# Patient Record
Sex: FEMALE | Race: WHITE | NOT HISPANIC OR LATINO
[De-identification: names, ages, dates, MRNs, and addresses within clinical notes are randomized per-mention and may not be internally consistent; named-entity substitution may affect disease eponyms.]

---

## 2017-01-18 ENCOUNTER — APPOINTMENT (OUTPATIENT)
Dept: NEUROLOGY | Facility: CLINIC | Age: 42
End: 2017-01-18

## 2017-01-18 VITALS
HEART RATE: 75 BPM | SYSTOLIC BLOOD PRESSURE: 97 MMHG | HEIGHT: 65 IN | WEIGHT: 187 LBS | OXYGEN SATURATION: 95 % | TEMPERATURE: 98 F | DIASTOLIC BLOOD PRESSURE: 62 MMHG | BODY MASS INDEX: 31.16 KG/M2

## 2017-01-18 DIAGNOSIS — Z83.49 FAMILY HISTORY OF OTHER ENDOCRINE, NUTRITIONAL AND METABOLIC DISEASES: ICD-10-CM

## 2017-01-18 DIAGNOSIS — Z82.0 FAMILY HISTORY OF EPILEPSY AND OTHER DISEASES OF THE NERVOUS SYSTEM: ICD-10-CM

## 2017-01-18 DIAGNOSIS — Z83.3 FAMILY HISTORY OF DIABETES MELLITUS: ICD-10-CM

## 2017-03-01 ENCOUNTER — OUTPATIENT (OUTPATIENT)
Dept: OUTPATIENT SERVICES | Facility: HOSPITAL | Age: 42
LOS: 1 days | End: 2017-03-01
Payer: COMMERCIAL

## 2017-03-01 PROCEDURE — 70551 MRI BRAIN STEM W/O DYE: CPT

## 2017-03-01 PROCEDURE — 70551 MRI BRAIN STEM W/O DYE: CPT | Mod: 26

## 2017-03-15 ENCOUNTER — APPOINTMENT (OUTPATIENT)
Dept: NEUROLOGY | Facility: CLINIC | Age: 42
End: 2017-03-15

## 2017-03-15 VITALS
DIASTOLIC BLOOD PRESSURE: 75 MMHG | HEART RATE: 78 BPM | SYSTOLIC BLOOD PRESSURE: 114 MMHG | WEIGHT: 187 LBS | HEIGHT: 65 IN | BODY MASS INDEX: 31.16 KG/M2 | TEMPERATURE: 97.5 F | OXYGEN SATURATION: 88 %

## 2017-05-16 ENCOUNTER — FORM ENCOUNTER (OUTPATIENT)
Age: 42
End: 2017-05-16

## 2017-05-17 ENCOUNTER — APPOINTMENT (OUTPATIENT)
Dept: NEUROLOGY | Facility: CLINIC | Age: 42
End: 2017-05-17

## 2017-05-17 ENCOUNTER — APPOINTMENT (OUTPATIENT)
Dept: ORTHOPEDIC SURGERY | Facility: CLINIC | Age: 42
End: 2017-05-17

## 2017-05-17 ENCOUNTER — OUTPATIENT (OUTPATIENT)
Dept: OUTPATIENT SERVICES | Facility: HOSPITAL | Age: 42
LOS: 1 days | End: 2017-05-17
Payer: COMMERCIAL

## 2017-05-17 VITALS
SYSTOLIC BLOOD PRESSURE: 119 MMHG | HEIGHT: 65 IN | WEIGHT: 199 LBS | TEMPERATURE: 98.5 F | BODY MASS INDEX: 33.15 KG/M2 | DIASTOLIC BLOOD PRESSURE: 73 MMHG | OXYGEN SATURATION: 97 % | HEART RATE: 77 BPM

## 2017-05-17 PROCEDURE — 73502 X-RAY EXAM HIP UNI 2-3 VIEWS: CPT

## 2017-05-17 PROCEDURE — 73502 X-RAY EXAM HIP UNI 2-3 VIEWS: CPT | Mod: 26,LT

## 2017-10-03 ENCOUNTER — EMERGENCY (EMERGENCY)
Facility: HOSPITAL | Age: 42
LOS: 1 days | Discharge: HOME | End: 2017-10-03
Attending: EMERGENCY MEDICINE | Admitting: EMERGENCY MEDICINE
Payer: COMMERCIAL

## 2017-10-03 VITALS
TEMPERATURE: 99 F | DIASTOLIC BLOOD PRESSURE: 82 MMHG | OXYGEN SATURATION: 100 % | SYSTOLIC BLOOD PRESSURE: 121 MMHG | RESPIRATION RATE: 17 BRPM | WEIGHT: 186.95 LBS | HEART RATE: 79 BPM

## 2017-10-03 VITALS
TEMPERATURE: 99 F | RESPIRATION RATE: 18 BRPM | DIASTOLIC BLOOD PRESSURE: 76 MMHG | SYSTOLIC BLOOD PRESSURE: 121 MMHG | HEART RATE: 60 BPM | OXYGEN SATURATION: 100 %

## 2017-10-03 DIAGNOSIS — Z88.1 ALLERGY STATUS TO OTHER ANTIBIOTIC AGENTS STATUS: ICD-10-CM

## 2017-10-03 DIAGNOSIS — R07.89 OTHER CHEST PAIN: ICD-10-CM

## 2017-10-03 DIAGNOSIS — R10.13 EPIGASTRIC PAIN: ICD-10-CM

## 2017-10-03 DIAGNOSIS — J45.909 UNSPECIFIED ASTHMA, UNCOMPLICATED: ICD-10-CM

## 2017-10-03 DIAGNOSIS — Z88.8 ALLERGY STATUS TO OTHER DRUGS, MEDICAMENTS AND BIOLOGICAL SUBSTANCES: ICD-10-CM

## 2017-10-03 LAB
ALBUMIN SERPL ELPH-MCNC: 3.9 G/DL — SIGNIFICANT CHANGE UP (ref 3.3–5)
ALP SERPL-CCNC: 85 U/L — SIGNIFICANT CHANGE UP (ref 40–120)
ALT FLD-CCNC: 15 U/L — SIGNIFICANT CHANGE UP (ref 10–45)
ANION GAP SERPL CALC-SCNC: 15 MMOL/L — SIGNIFICANT CHANGE UP (ref 5–17)
AST SERPL-CCNC: 21 U/L — SIGNIFICANT CHANGE UP (ref 10–40)
BASOPHILS NFR BLD AUTO: 0.3 % — SIGNIFICANT CHANGE UP (ref 0–2)
BILIRUB SERPL-MCNC: 0.5 MG/DL — SIGNIFICANT CHANGE UP (ref 0.2–1.2)
BUN SERPL-MCNC: 11 MG/DL — SIGNIFICANT CHANGE UP (ref 7–23)
CALCIUM SERPL-MCNC: 9.4 MG/DL — SIGNIFICANT CHANGE UP (ref 8.4–10.5)
CHLORIDE SERPL-SCNC: 103 MMOL/L — SIGNIFICANT CHANGE UP (ref 96–108)
CO2 SERPL-SCNC: 20 MMOL/L — LOW (ref 22–31)
CREAT SERPL-MCNC: 0.76 MG/DL — SIGNIFICANT CHANGE UP (ref 0.5–1.3)
EOSINOPHIL NFR BLD AUTO: 1 % — SIGNIFICANT CHANGE UP (ref 0–6)
GLUCOSE SERPL-MCNC: 108 MG/DL — HIGH (ref 70–99)
HCT VFR BLD CALC: 38 % — SIGNIFICANT CHANGE UP (ref 34.5–45)
HGB BLD-MCNC: 12.8 G/DL — SIGNIFICANT CHANGE UP (ref 11.5–15.5)
LIDOCAIN IGE QN: 46 U/L — SIGNIFICANT CHANGE UP (ref 7–60)
LYMPHOCYTES # BLD AUTO: 16.1 % — SIGNIFICANT CHANGE UP (ref 13–44)
MCHC RBC-ENTMCNC: 29.1 PG — SIGNIFICANT CHANGE UP (ref 27–34)
MCHC RBC-ENTMCNC: 33.7 G/DL — SIGNIFICANT CHANGE UP (ref 32–36)
MCV RBC AUTO: 86.4 FL — SIGNIFICANT CHANGE UP (ref 80–100)
MONOCYTES NFR BLD AUTO: 8.2 % — SIGNIFICANT CHANGE UP (ref 2–14)
NEUTROPHILS NFR BLD AUTO: 74.4 % — SIGNIFICANT CHANGE UP (ref 43–77)
PLATELET # BLD AUTO: 234 K/UL — SIGNIFICANT CHANGE UP (ref 150–400)
POTASSIUM SERPL-MCNC: 4.3 MMOL/L — SIGNIFICANT CHANGE UP (ref 3.5–5.3)
POTASSIUM SERPL-SCNC: 4.3 MMOL/L — SIGNIFICANT CHANGE UP (ref 3.5–5.3)
PROT SERPL-MCNC: 7.7 G/DL — SIGNIFICANT CHANGE UP (ref 6–8.3)
RBC # BLD: 4.4 M/UL — SIGNIFICANT CHANGE UP (ref 3.8–5.2)
RBC # FLD: 13.7 % — SIGNIFICANT CHANGE UP (ref 10.3–16.9)
SODIUM SERPL-SCNC: 138 MMOL/L — SIGNIFICANT CHANGE UP (ref 135–145)
WBC # BLD: 6.1 K/UL — SIGNIFICANT CHANGE UP (ref 3.8–10.5)
WBC # FLD AUTO: 6.1 K/UL — SIGNIFICANT CHANGE UP (ref 3.8–10.5)

## 2017-10-03 PROCEDURE — 93005 ELECTROCARDIOGRAM TRACING: CPT

## 2017-10-03 PROCEDURE — 96374 THER/PROPH/DIAG INJ IV PUSH: CPT

## 2017-10-03 PROCEDURE — 99284 EMERGENCY DEPT VISIT MOD MDM: CPT | Mod: 25

## 2017-10-03 PROCEDURE — 80053 COMPREHEN METABOLIC PANEL: CPT

## 2017-10-03 PROCEDURE — 85025 COMPLETE CBC W/AUTO DIFF WBC: CPT

## 2017-10-03 PROCEDURE — 93010 ELECTROCARDIOGRAM REPORT: CPT

## 2017-10-03 PROCEDURE — 36415 COLL VENOUS BLD VENIPUNCTURE: CPT

## 2017-10-03 PROCEDURE — 83690 ASSAY OF LIPASE: CPT

## 2017-10-03 RX ORDER — FAMOTIDINE 10 MG/ML
20 INJECTION INTRAVENOUS ONCE
Qty: 0 | Refills: 0 | Status: COMPLETED | OUTPATIENT
Start: 2017-10-03 | End: 2017-10-03

## 2017-10-03 RX ORDER — LIDOCAINE 4 G/100G
10 CREAM TOPICAL ONCE
Qty: 0 | Refills: 0 | Status: COMPLETED | OUTPATIENT
Start: 2017-10-03 | End: 2017-10-03

## 2017-10-03 RX ORDER — PANTOPRAZOLE SODIUM 20 MG/1
1 TABLET, DELAYED RELEASE ORAL
Qty: 30 | Refills: 0
Start: 2017-10-03 | End: 2017-11-02

## 2017-10-03 RX ORDER — PANTOPRAZOLE SODIUM 20 MG/1
40 TABLET, DELAYED RELEASE ORAL ONCE
Qty: 0 | Refills: 0 | Status: COMPLETED | OUTPATIENT
Start: 2017-10-03 | End: 2017-10-03

## 2017-10-03 RX ADMIN — PANTOPRAZOLE SODIUM 40 MILLIGRAM(S): 20 TABLET, DELAYED RELEASE ORAL at 14:09

## 2017-10-03 RX ADMIN — LIDOCAINE 10 MILLILITER(S): 4 CREAM TOPICAL at 14:10

## 2017-10-03 RX ADMIN — FAMOTIDINE 20 MILLIGRAM(S): 10 INJECTION INTRAVENOUS at 12:25

## 2017-10-03 NOTE — ED PROVIDER NOTE - MEDICAL DECISION MAKING DETAILS
41 y/o f epigastric pain x 2 weeks; EKG nonischemic, labs wnl, given GI cocktail, sx likely of GI etiology, discussed needs endoscopy, GI f/u, started on PPI

## 2017-10-03 NOTE — ED ADULT NURSE NOTE - PMH
Asthma  Asthma  Congenital deformity of hip joint  Hip dysplasia  Depressive disorder  Depression  Leukemic reticuloendotheliosis, extranodal and solid organ sites  Leukemia, hairy cell  Malignant neoplasm of cervix uteri  Cervical cancer

## 2017-10-03 NOTE — ED ADULT NURSE NOTE - OBJECTIVE STATEMENT
Chronic intermittent aching, throbbing generalized abdominal pain radiates to back, 8/10, no nausea/vomitting,  w/ diarrhea X 1 today, no dysuria.

## 2017-10-03 NOTE — ED ADULT TRIAGE NOTE - CHIEF COMPLAINT QUOTE
intermittent chest pressure radiating to back x 2 weeks worse with inhalation.  Hx GERD, asthma.  Denies falls / recent long trips, cough, fever, chills

## 2017-10-03 NOTE — ED PROVIDER NOTE - OBJECTIVE STATEMENT
41 y/o f presents c/o epigastric abd pain for the past 2 weeks.  Pt stating radiates up to her chest, pain worsens with eating.  Pt denies n/v, fever, SOB, all other ROS negative.

## 2017-10-03 NOTE — ED ADULT NURSE NOTE - CHPI ED SYMPTOMS NEG
no nausea/no hematuria/no burning urination/no dysuria/no vomiting/no fever/no abdominal distension/no blood in stool/no chills

## 2017-10-03 NOTE — ED PROVIDER NOTE - ATTENDING CONTRIBUTION TO CARE
2 weeks epigastric pain radiating up chest, belly with mild epigastric ttp.  symmetric pulses.  nonischemic ekg.  pa work noted.  pt tx and improved.  likely gerd.  plan outpt fu.  do not suspect acs, pe, dissection, perf viscous given context.

## 2017-10-10 ENCOUNTER — APPOINTMENT (OUTPATIENT)
Dept: GASTROENTEROLOGY | Facility: CLINIC | Age: 42
End: 2017-10-10

## 2017-11-01 NOTE — ED ADULT TRIAGE NOTE - TEMPERATURE IN FAHRENHEIT (DEGREES F)
Pt's father states that [facility] can call him at 11:30 am on Friday, 11/3/17. He will be at lunch and will be able to talk to [facility]. Ask pt's father what it is we can help him with, since [facility] has  Not been able to reach him.  He stated he is at work and can not 98.8

## 2018-02-14 ENCOUNTER — FORM ENCOUNTER (OUTPATIENT)
Age: 43
End: 2018-02-14

## 2018-02-15 ENCOUNTER — APPOINTMENT (OUTPATIENT)
Dept: ORTHOPEDIC SURGERY | Facility: CLINIC | Age: 43
End: 2018-02-15
Payer: MEDICAID

## 2018-02-15 ENCOUNTER — OUTPATIENT (OUTPATIENT)
Dept: OUTPATIENT SERVICES | Facility: HOSPITAL | Age: 43
LOS: 1 days | End: 2018-02-15
Payer: COMMERCIAL

## 2018-02-15 VITALS — BODY MASS INDEX: 31.49 KG/M2 | WEIGHT: 189 LBS | HEIGHT: 65 IN

## 2018-02-15 PROCEDURE — 73564 X-RAY EXAM KNEE 4 OR MORE: CPT

## 2018-02-15 PROCEDURE — 99213 OFFICE O/P EST LOW 20 MIN: CPT

## 2018-02-15 PROCEDURE — 73564 X-RAY EXAM KNEE 4 OR MORE: CPT | Mod: 26,LT

## 2018-04-06 ENCOUNTER — APPOINTMENT (OUTPATIENT)
Dept: NEUROLOGY | Facility: CLINIC | Age: 43
End: 2018-04-06

## 2018-08-24 ENCOUNTER — APPOINTMENT (OUTPATIENT)
Dept: ORTHOPEDIC SURGERY | Facility: CLINIC | Age: 43
End: 2018-08-24
Payer: MEDICAID

## 2018-08-24 DIAGNOSIS — M54.16 RADICULOPATHY, LUMBAR REGION: ICD-10-CM

## 2018-08-24 DIAGNOSIS — E66.3 OVERWEIGHT: ICD-10-CM

## 2018-08-24 DIAGNOSIS — M25.562 PAIN IN LEFT KNEE: ICD-10-CM

## 2018-08-24 PROCEDURE — 99214 OFFICE O/P EST MOD 30 MIN: CPT | Mod: 25

## 2018-08-24 PROCEDURE — 20610 DRAIN/INJ JOINT/BURSA W/O US: CPT | Mod: LT

## 2018-08-24 RX ORDER — MELOXICAM 15 MG/1
15 TABLET ORAL
Qty: 30 | Refills: 1 | Status: DISCONTINUED | COMMUNITY
Start: 2018-02-15 | End: 2018-08-24

## 2018-09-26 ENCOUNTER — APPOINTMENT (OUTPATIENT)
Dept: PHYSICAL MEDICINE AND REHAB | Facility: CLINIC | Age: 43
End: 2018-09-26

## 2018-10-17 ENCOUNTER — APPOINTMENT (OUTPATIENT)
Dept: PHYSICAL MEDICINE AND REHAB | Facility: CLINIC | Age: 43
End: 2018-10-17
Payer: MEDICAID

## 2018-10-17 VITALS
OXYGEN SATURATION: 98 % | HEIGHT: 65 IN | SYSTOLIC BLOOD PRESSURE: 110 MMHG | DIASTOLIC BLOOD PRESSURE: 70 MMHG | WEIGHT: 187 LBS | BODY MASS INDEX: 31.16 KG/M2 | HEART RATE: 90 BPM

## 2018-10-17 DIAGNOSIS — G89.29 LOW BACK PAIN: ICD-10-CM

## 2018-10-17 DIAGNOSIS — M54.5 LOW BACK PAIN: ICD-10-CM

## 2018-10-17 PROCEDURE — 99203 OFFICE O/P NEW LOW 30 MIN: CPT

## 2019-01-24 ENCOUNTER — OTHER (OUTPATIENT)
Age: 44
End: 2019-01-24

## 2021-01-28 DIAGNOSIS — N64.9 DISORDER OF BREAST, UNSPECIFIED: ICD-10-CM

## 2021-01-28 DIAGNOSIS — Z87.39 PERSONAL HISTORY OF OTHER DISEASES OF THE MUSCULOSKELETAL SYSTEM AND CONNECTIVE TISSUE: ICD-10-CM

## 2021-01-28 DIAGNOSIS — Z80.3 FAMILY HISTORY OF MALIGNANT NEOPLASM OF BREAST: ICD-10-CM

## 2021-02-26 ENCOUNTER — NON-APPOINTMENT (OUTPATIENT)
Age: 46
End: 2021-02-26

## 2021-03-01 ENCOUNTER — NON-APPOINTMENT (OUTPATIENT)
Age: 46
End: 2021-03-01

## 2021-04-08 ENCOUNTER — APPOINTMENT (OUTPATIENT)
Dept: BREAST CENTER | Facility: CLINIC | Age: 46
End: 2021-04-08

## 2021-06-29 ENCOUNTER — APPOINTMENT (OUTPATIENT)
Dept: ORTHOPEDIC SURGERY | Facility: CLINIC | Age: 46
End: 2021-06-29
Payer: MEDICAID

## 2021-06-29 ENCOUNTER — OUTPATIENT (OUTPATIENT)
Dept: OUTPATIENT SERVICES | Facility: HOSPITAL | Age: 46
LOS: 1 days | End: 2021-06-29
Payer: COMMERCIAL

## 2021-06-29 ENCOUNTER — RESULT REVIEW (OUTPATIENT)
Age: 46
End: 2021-06-29

## 2021-06-29 DIAGNOSIS — M21.42 FLAT FOOT [PES PLANUS] (ACQUIRED), RIGHT FOOT: ICD-10-CM

## 2021-06-29 DIAGNOSIS — M21.41 FLAT FOOT [PES PLANUS] (ACQUIRED), RIGHT FOOT: ICD-10-CM

## 2021-06-29 DIAGNOSIS — M17.12 UNILATERAL PRIMARY OSTEOARTHRITIS, LEFT KNEE: ICD-10-CM

## 2021-06-29 PROCEDURE — 72020 X-RAY EXAM OF SPINE 1 VIEW: CPT

## 2021-06-29 PROCEDURE — 73502 X-RAY EXAM HIP UNI 2-3 VIEWS: CPT

## 2021-06-29 PROCEDURE — 73564 X-RAY EXAM KNEE 4 OR MORE: CPT | Mod: 26,50

## 2021-06-29 PROCEDURE — 20610 DRAIN/INJ JOINT/BURSA W/O US: CPT | Mod: LT

## 2021-06-29 PROCEDURE — 72020 X-RAY EXAM OF SPINE 1 VIEW: CPT | Mod: 26

## 2021-06-29 PROCEDURE — 73564 X-RAY EXAM KNEE 4 OR MORE: CPT

## 2021-06-29 PROCEDURE — 73502 X-RAY EXAM HIP UNI 2-3 VIEWS: CPT | Mod: 26,RT

## 2021-06-29 PROCEDURE — 99214 OFFICE O/P EST MOD 30 MIN: CPT | Mod: 25

## 2021-06-29 NOTE — HISTORY OF PRESENT ILLNESS
[___ yrs] : [unfilled] year(s) ago [4] : a current pain level of 4/10 [Constant] : ~He/She~ states the symptoms seem to be constant [Bending] : worsened by bending [Walking] : worsened by walking [NSAIDs] : relieved by nonsteroidal anti-inflammatory drugs [Rest] : relieved by rest [de-identified] : 6/29/21: 45y/o female presenting for left knee, right foot, and right hip pain. Symptoms all progressive for several years. Left hip was replaced by Dr. Alexis in 2015 and she has no complaints. Left knee with diffuse pain; previously responded to a CSI in 2018. Right flatfoot deformity associated with hindfoot and posteromedial ankle pain, for which she uses a figure of eight brace (doesn't help much). The left foot is also flat and unstable but much less painful. Right hip is less painful than the left knee and right foot. 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 She has moderate low back pain.\par \par PMH sig for asthma and hairy cell leukemia, in remission since 2014.  [de-identified] : describes throbbing pain

## 2021-06-29 NOTE — REVIEW OF SYSTEMS
[Negative] : Heme/Lymph [Joint Pain] : joint pain [Joint Stiffness] : joint stiffness [Anxiety] : anxiety

## 2021-06-29 NOTE — PHYSICAL EXAM
[de-identified] : General appearance: well nourished and hydrated, pleasant, alert and oriented x 3, cooperative.  Obese habitus.\par HEENT: normocephalic, EOM intact, wearing mask, external auditory canal clear.  \par Cardiovascular: no lower leg edema, no varicosities, dorsalis pedis pulses palpable and symmetric.  \par Lymphatics: no palpable lymphadenopathy, no lymphedema.  \par Neurologic: sensation is normal, no muscle weakness in upper or lower extremities, patella tendon reflexes present and symmetric.  \par Dermatologic: skin moist, warm, no rash.  \par Spine: cervical spine with normal lordosis and painless range of motion, thoracic spine with normal kyphosis and painless range of motion, lumbosacral spine with increased lordosis and painful restricted range of motion. \par Gait: bilaterally antalgic with bilateral hindfoot valgus collapse worse on the left.\par \par Left knee:\par - Soft tissue swelling: moderate\par - Ecchymosis: none\par - Erythema: none\par - Effusion: small\par - Wounds: none\par - Alignment: normal\par - Tenderness: peripatellar and posterior\par - ROM: 5 hyper - 130\par - Collateral laxity: none\par - Cruciate laxity: none\par - Popliteal angle (degrees): 45\par - Quad strength: 5/5\par \par Right knee:\par - Soft tissue swelling: mild\par - Ecchymosis: none\par - Erythema: none\par - Effusion: small\par - Wounds: none\par - Alignment: normal\par - Tenderness: mild peripatellar\par - ROM: 5 hyper - 130\par - Collateral laxity: none\par - Cruciate laxity: none\par - Popliteal angle (degrees): 45\par - Quad strength: 5/5\par \par Left hip:\par - Swelling: none\par - Ecchymosis: none\par - Erythema: none\par - Wounds: healed long posterolateral incision\par - Tenderness: none\par - ROM: 110 flexion, 0 extension, 20 adduction, 40 abduction, 15 internal rotation, 60 external rotation\par - ED: painless\par - FADIR: painless\par - Kiana: negative\par - Stinchfield: unable to perform 2/2 weakness\par - Flexor power: 3-/5\par - Abductor power: 3-/5\par \par Right hip:\par - Swelling: none\par - Ecchymosis: none\par - Erythema: none\par - Wounds: healed posterolateral incision and inguinal incision\par - Tenderness: anterior\par - ROM: 100 flexion, 0 extension, 10 adduction, 30 abduction, 5 internal rotation, 45 external rotation\par - ED: painful\par - FADIR: painful\par - Kiana: negative\par - Stinchfield: unable to perform 2/2 weakness\par - Flexor power: 2/5\par - Abductor power: 2/5\par \par Bilateral feet: moderate right and severe left pes planus. Left side is grossly unstable across the subtalar joint but painless on palpation. Right side is less deformed and unstable but much more painful along the PTT. [de-identified] : A lateral view of the lumbosacral spine, weightbearing AP pelvis, and 2 additional views (frog lateral and false profile) of the bilateral hips were obtained today, interpreted by me, and reviewed with the patient.\par \par Lumbar spine --\par Alignment: increased lordosis\par Spondylosis: none\par Listhesis: grade 1 L4/5\par Posterior elements: moderate multilevel facet arthrosis\par \par Pelvic alignment: remodeling noted along both iliac crests and right more than left iliopectineal lines. Pedunculated osteophytic process over left acetabulum. Relatively gracile rami.\par \par Right hip --\par Alignment: coxa vara/profunda\par Arthritis: moderate/severe\par Deformity: coxa vara and proximal femoral varus\par Osteonecrosis: none\par \par Left hip -- VERONICA in position, stable as compared to prior imaging without evidence of mechanical complication. Varus remodeling noted of proximal femur, also unchanged from prior imaging.\par \par Right knee --\par Alignment: normal\par Arthritis: severe patellofemoral, KL0-1\par Deformity: none\par Patellar height: normal\par Patellar tracking: central\par \par Left knee --\par Alignment: normal\par Arthritis: moderate-severe patellofemoral, KL0-1\par Deformity: none\par Patellar height: normal\par Patellar tracking: central

## 2021-06-29 NOTE — DISCUSSION/SUMMARY
[de-identified] : 45y/o female with right hip osteoarthritis and proximal femoral deformity s/p multiple childhood surgeries for DDH, well-functioning L VERONICA, bilateral patellofemoral osteoarthritis, bilateral severe flatfoot deformity, suspected neuromuscular vs. connective tissue disorder\par - PT\par - HEP encouraged\par - Tylenol + meloxicam\par - CSI administered to the left knee\par - We discussed neuromuscular left knee bracing but I don't think her habitus would support it\par - Refer to Dr. Arrington for the feet; impression is that the feet should probably be her first surgery prior to considering the left knee and right hip\par - Follow up in 2mo, no new XRs needed

## 2021-07-08 DIAGNOSIS — M79.671 PAIN IN RIGHT FOOT: ICD-10-CM

## 2021-07-08 DIAGNOSIS — M79.672 PAIN IN RIGHT FOOT: ICD-10-CM

## 2021-07-09 ENCOUNTER — APPOINTMENT (OUTPATIENT)
Dept: ORTHOPEDIC SURGERY | Facility: CLINIC | Age: 46
End: 2021-07-09
Payer: MEDICAID

## 2021-07-09 DIAGNOSIS — M25.562 PAIN IN RIGHT KNEE: ICD-10-CM

## 2021-07-09 DIAGNOSIS — M21.40 FLAT FOOT [PES PLANUS] (ACQUIRED), UNSPECIFIED FOOT: ICD-10-CM

## 2021-07-09 DIAGNOSIS — M25.561 PAIN IN RIGHT KNEE: ICD-10-CM

## 2021-07-09 DIAGNOSIS — M21.6X1 OTHER ACQUIRED DEFORMITIES OF RIGHT FOOT: ICD-10-CM

## 2021-07-09 DIAGNOSIS — M20.11 HALLUX VALGUS (ACQUIRED), RIGHT FOOT: ICD-10-CM

## 2021-07-09 PROCEDURE — 99215 OFFICE O/P EST HI 40 MIN: CPT

## 2021-07-09 PROCEDURE — 73610 X-RAY EXAM OF ANKLE: CPT | Mod: 50

## 2021-07-09 PROCEDURE — 73630 X-RAY EXAM OF FOOT: CPT | Mod: 50

## 2021-07-09 RX ORDER — DULOXETINE HYDROCHLORIDE 30 MG/1
CAPSULE, DELAYED RELEASE ORAL
Refills: 0 | Status: DISCONTINUED | COMMUNITY
End: 2021-07-09

## 2021-07-09 RX ORDER — FAMOTIDINE 40 MG/1
40 TABLET, FILM COATED ORAL
Qty: 30 | Refills: 0 | Status: DISCONTINUED | COMMUNITY
Start: 2021-02-17 | End: 2021-07-09

## 2021-07-09 RX ORDER — CHLORHEXIDINE GLUCONATE, 0.12% ORAL RINSE 1.2 MG/ML
0.12 SOLUTION DENTAL
Qty: 473 | Refills: 0 | Status: DISCONTINUED | COMMUNITY
Start: 2021-02-26 | End: 2021-07-09

## 2021-07-09 RX ORDER — TOPIRAMATE 50 MG/1
TABLET, COATED ORAL
Refills: 0 | Status: DISCONTINUED | COMMUNITY
End: 2021-07-09

## 2021-07-09 RX ORDER — AMOXICILLIN AND CLAVULANATE POTASSIUM 500; 125 MG/1; MG/1
500-125 TABLET, FILM COATED ORAL
Qty: 20 | Refills: 0 | Status: DISCONTINUED | COMMUNITY
Start: 2021-05-15 | End: 2021-07-09

## 2021-07-09 RX ORDER — KETOTIFEN FUMARATE 0.25 MG/ML
0.03 SOLUTION/ DROPS OPHTHALMIC
Qty: 5 | Refills: 0 | Status: DISCONTINUED | COMMUNITY
Start: 2021-01-29 | End: 2021-07-09

## 2021-07-09 RX ORDER — DICLOFENAC SODIUM 10 MG/G
1 GEL TOPICAL DAILY
Qty: 3 | Refills: 2 | Status: DISCONTINUED | COMMUNITY
Start: 2018-08-24 | End: 2021-07-09

## 2021-08-04 ENCOUNTER — APPOINTMENT (OUTPATIENT)
Dept: ORTHOPEDIC SURGERY | Facility: CLINIC | Age: 46
End: 2021-08-04
Payer: MEDICAID

## 2021-08-04 VITALS — BODY MASS INDEX: 31.65 KG/M2 | HEIGHT: 65 IN | RESPIRATION RATE: 16 BRPM | WEIGHT: 190 LBS

## 2021-08-04 DIAGNOSIS — M21.41 FLAT FOOT [PES PLANUS] (ACQUIRED), RIGHT FOOT: ICD-10-CM

## 2021-08-04 DIAGNOSIS — M19.071 PRIMARY OSTEOARTHRITIS, RIGHT ANKLE AND FOOT: ICD-10-CM

## 2021-08-04 DIAGNOSIS — M76.821 POSTERIOR TIBIAL TENDINITIS, RIGHT LEG: ICD-10-CM

## 2021-08-04 DIAGNOSIS — G57.91 UNSPECIFIED MONONEUROPATHY OF RIGHT LOWER LIMB: ICD-10-CM

## 2021-08-04 DIAGNOSIS — M25.871 OTHER SPECIFIED JOINT DISORDERS, RIGHT ANKLE AND FOOT: ICD-10-CM

## 2021-08-04 PROCEDURE — 20605 DRAIN/INJ JOINT/BURSA W/O US: CPT | Mod: RT

## 2021-08-04 PROCEDURE — 77002 NEEDLE LOCALIZATION BY XRAY: CPT

## 2021-08-04 PROCEDURE — 99214 OFFICE O/P EST MOD 30 MIN: CPT | Mod: 25

## 2021-08-04 RX ORDER — FLUOXETINE HYDROCHLORIDE 10 MG/1
10 CAPSULE ORAL
Qty: 30 | Refills: 0 | Status: DISCONTINUED | COMMUNITY
Start: 2021-02-01 | End: 2021-08-04

## 2021-08-04 RX ORDER — ALBUTEROL SULFATE 90 UG/1
108 (90 BASE) INHALANT RESPIRATORY (INHALATION)
Qty: 18 | Refills: 0 | Status: DISCONTINUED | COMMUNITY
Start: 2021-06-07 | End: 2021-08-04

## 2021-08-04 RX ORDER — FLUOXETINE HYDROCHLORIDE 20 MG/1
20 CAPSULE ORAL
Qty: 30 | Refills: 0 | Status: DISCONTINUED | COMMUNITY
Start: 2021-05-11 | End: 2021-08-04

## 2021-08-05 PROBLEM — G57.91 NEURITIS OF RIGHT LOWER EXTREMITY: Status: ACTIVE | Noted: 2021-08-04

## 2021-08-05 PROBLEM — M21.41 ACQUIRED PES PLANOVALGUS OF RIGHT FOOT: Status: ACTIVE | Noted: 2021-08-05

## 2021-08-05 PROBLEM — M25.871 SUBFIBULAR IMPINGEMENT OF RIGHT LOWER EXTREMITY: Status: ACTIVE | Noted: 2021-08-05

## 2021-08-05 PROBLEM — M19.071 ARTHRITIS OF RIGHT SUBTALAR JOINT: Status: ACTIVE | Noted: 2021-08-05

## 2021-08-05 PROBLEM — M76.821 POSTERIOR TIBIAL TENDINITIS OF RIGHT LOWER EXTREMITY: Status: ACTIVE | Noted: 2021-07-09

## 2021-08-31 ENCOUNTER — APPOINTMENT (OUTPATIENT)
Dept: ORTHOPEDIC SURGERY | Facility: CLINIC | Age: 46
End: 2021-08-31

## 2021-11-10 ENCOUNTER — APPOINTMENT (OUTPATIENT)
Dept: ORTHOPEDIC SURGERY | Facility: CLINIC | Age: 46
End: 2021-11-10
Payer: MEDICAID

## 2021-11-10 DIAGNOSIS — M24.20 DISORDER OF LIGAMENT, UNSPECIFIED SITE: ICD-10-CM

## 2021-11-10 PROCEDURE — 20605 DRAIN/INJ JOINT/BURSA W/O US: CPT

## 2021-11-10 PROCEDURE — 99214 OFFICE O/P EST MOD 30 MIN: CPT | Mod: 25

## 2021-11-10 PROCEDURE — 77002 NEEDLE LOCALIZATION BY XRAY: CPT

## 2021-11-11 VITALS — BODY MASS INDEX: 31.65 KG/M2 | RESPIRATION RATE: 16 BRPM | WEIGHT: 190 LBS | HEIGHT: 65 IN

## 2021-11-22 PROBLEM — M24.20 LIGAMENTOUS LAXITY OF MULTIPLE SITES: Status: ACTIVE | Noted: 2021-07-09

## 2021-12-22 ENCOUNTER — APPOINTMENT (OUTPATIENT)
Dept: ORTHOPEDIC SURGERY | Facility: CLINIC | Age: 46
End: 2021-12-22

## 2022-06-22 ENCOUNTER — APPOINTMENT (OUTPATIENT)
Dept: ORTHOPEDIC SURGERY | Facility: CLINIC | Age: 47
End: 2022-06-22
Payer: MEDICAID

## 2022-06-22 VITALS — WEIGHT: 190 LBS | BODY MASS INDEX: 31.65 KG/M2 | HEIGHT: 65 IN | RESPIRATION RATE: 16 BRPM

## 2022-06-22 DIAGNOSIS — M25.572 PAIN IN LEFT ANKLE AND JOINTS OF LEFT FOOT: ICD-10-CM

## 2022-06-22 DIAGNOSIS — G89.29 PAIN IN LEFT ANKLE AND JOINTS OF LEFT FOOT: ICD-10-CM

## 2022-06-22 PROCEDURE — 99214 OFFICE O/P EST MOD 30 MIN: CPT

## 2022-06-23 ENCOUNTER — NON-APPOINTMENT (OUTPATIENT)
Age: 47
End: 2022-06-23

## 2022-08-09 ENCOUNTER — OUTPATIENT (OUTPATIENT)
Dept: OUTPATIENT SERVICES | Facility: HOSPITAL | Age: 47
LOS: 1 days | End: 2022-08-09
Payer: COMMERCIAL

## 2022-08-09 ENCOUNTER — RESULT REVIEW (OUTPATIENT)
Age: 47
End: 2022-08-09

## 2022-08-09 ENCOUNTER — APPOINTMENT (OUTPATIENT)
Dept: ORTHOPEDIC SURGERY | Facility: CLINIC | Age: 47
End: 2022-08-09

## 2022-08-09 VITALS
HEIGHT: 65 IN | BODY MASS INDEX: 31.65 KG/M2 | SYSTOLIC BLOOD PRESSURE: 121 MMHG | DIASTOLIC BLOOD PRESSURE: 79 MMHG | OXYGEN SATURATION: 99 % | WEIGHT: 190 LBS | HEART RATE: 90 BPM

## 2022-08-09 PROCEDURE — 72020 X-RAY EXAM OF SPINE 1 VIEW: CPT

## 2022-08-09 PROCEDURE — 73521 X-RAY EXAM HIPS BI 2 VIEWS: CPT

## 2022-08-09 PROCEDURE — 72020 X-RAY EXAM OF SPINE 1 VIEW: CPT | Mod: 26

## 2022-08-09 PROCEDURE — 99213 OFFICE O/P EST LOW 20 MIN: CPT

## 2022-08-09 PROCEDURE — 73521 X-RAY EXAM HIPS BI 2 VIEWS: CPT | Mod: 26

## 2022-08-09 NOTE — DISCUSSION/SUMMARY
[de-identified] : 48y/o female with right hip osteoarthritis and proximal femoral deformity s/p multiple childhood surgeries for DDH, well-functioning L VERONICA, bilateral patellofemoral osteoarthritis, bilateral severe flatfoot deformity, suspected neuromuscular vs. connective tissue disorder\par - She would benefit from R VERONICA but wishes to prioritize the left ankle and hindfoot for now\par - New ref given for PT\par - HEP\par - Tylenol + meloxicam as needed\par - RTC q6mo, no new XRs needed until next August. If the right hip overtakes the left ankle with respect to symptoms, she is a candidate for R VERONICA at any time

## 2022-08-09 NOTE — PHYSICAL EXAM
[de-identified] : General appearance: well nourished and hydrated, pleasant, alert and oriented x 3, cooperative.  Obese habitus.\par HEENT: normocephalic, EOM intact, wearing mask, external auditory canal clear.  \par Cardiovascular: no lower leg edema, no varicosities, dorsalis pedis pulses palpable and symmetric.  \par Lymphatics: no palpable lymphadenopathy, no lymphedema.  \par Neurologic: sensation is normal, no muscle weakness in upper or lower extremities, patella tendon reflexes present and symmetric.  \par Dermatologic: skin moist, warm, no rash.  \par Spine: cervical spine with normal lordosis and painless range of motion, thoracic spine with normal kyphosis and painless range of motion, lumbosacral spine with increased lordosis and painful restricted range of motion. \par Gait: bilaterally antalgic with bilateral hindfoot valgus collapse, worse on the left. Wearing a soft left ankle brace.\par \par Left knee:\par - Soft tissue swelling: none\par - Ecchymosis: none\par - Erythema: none\par - Effusion: trace\par - Wounds: none\par - Alignment: normal\par - Tenderness: mild peripatellar\par - ROM: 5 hyper - 130\par - Collateral laxity: none\par - Cruciate laxity: none\par - Popliteal angle (degrees): 45\par - Quad strength: 5/5\par \par Right knee:\par - Soft tissue swelling: mild\par - Ecchymosis: none\par - Erythema: none\par - Effusion: small\par - Wounds: none\par - Alignment: normal\par - Tenderness: mild peripatellar\par - ROM: 5 hyper - 130\par - Collateral laxity: none\par - Cruciate laxity: none\par - Popliteal angle (degrees): 45\par - Quad strength: 5/5\par \par Left hip:\par - Swelling: none\par - Ecchymosis: none\par - Erythema: none\par - Wounds: healed long posterolateral incision\par - Tenderness: none\par - ROM: 110 flexion, 0 extension, 20 adduction, 40 abduction, 15 internal rotation, 60 external rotation\par - ED: painless\par - FADIR: painless\par - Kiana: negative\par - Stinchfield: unable to perform 2/2 weakness\par - Flexor power: 3-/5\par - Abductor power: 3-/5\par \par Right hip:\par - Swelling: none\par - Ecchymosis: none\par - Erythema: none\par - Wounds: healed posterolateral incision and inguinal incision\par - Tenderness: anterior\par - ROM: 100 flexion, 0 extension, 10 adduction, 30 abduction, 5 internal rotation, 45 external rotation\par - ED: painful\par - FADIR: painful\par - Kiana: negative\par - Stinchfield: unable to perform 2/2 weakness\par - Flexor power: 2/5\par - Abductor power: 2/5 [de-identified] : A lateral view of the lumbosacral spine, weightbearing AP pelvis, and 2 additional views (frog lateral and false profile) of the left hip were interpreted by me and reviewed with the patient.\par \par Location of imaging: [facility]\par Date of exam: 8/9/22\par \par Lumbar spine --\par Alignment: increased lordosis\par Spondylosis: none\par Listhesis: grade 1 L4/5\par Posterior elements: moderate multilevel facet arthrosis\par \par Pelvic alignment: remodeling noted along both iliac crests and right more than left iliopectineal lines. Pedunculated osteophytic process over left acetabulum. Relatively gracile rami.\par \par Right hip --\par Alignment: coxa vara/profunda\par Arthritis: moderate/severe\par Deformity: coxa vara and proximal femoral varus\par Osteonecrosis: none\par \par Left hip -- VERONICA in position, stable as compared to prior imaging without evidence of mechanical complication. Varus remodeling noted of proximal femur, also unchanged from prior imaging.

## 2022-08-09 NOTE — HISTORY OF PRESENT ILLNESS
[de-identified] : 8/9/22: Following up for right hip and left knee osteoarthritis. The worst pain generator remains the left foot and ankle, currently under management by Dr. Arrington. The right hip is also moderately painful all the time and the source of some limping. Left knee has been nonpainful since the injection given by me last summer. She has been using a left Festus brace per Dr. Arrington since the start of 2022. Left hindfoot surgery has been mentioned but no definitive plans have yet been made.\par \par 6/29/21: 47y/o female presenting for left knee, right foot, and right hip pain. Symptoms all progressive for several years. Left hip was replaced by Dr. Alexis in 2015 and she has no complaints. Left knee with diffuse pain; previously responded to a CSI in 2018. Right flatfoot deformity associated with hindfoot and posteromedial ankle pain, for which she uses a figure of eight brace (doesn't help much). The left foot is also flat and unstable but much less painful. Right hip is less painful than the left knee and right foot. 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 She has moderate low back pain.\par \par PMH sig for asthma and hairy cell leukemia, in remission since 2014.

## 2022-08-10 ENCOUNTER — APPOINTMENT (OUTPATIENT)
Dept: ORTHOPEDIC SURGERY | Facility: CLINIC | Age: 47
End: 2022-08-10

## 2022-08-10 DIAGNOSIS — M21.42 FLAT FOOT [PES PLANUS] (ACQUIRED), LEFT FOOT: ICD-10-CM

## 2022-08-10 DIAGNOSIS — M19.072 PRIMARY OSTEOARTHRITIS, LEFT ANKLE AND FOOT: ICD-10-CM

## 2022-08-10 DIAGNOSIS — M76.822 POSTERIOR TIBIAL TENDINITIS, LEFT LEG: ICD-10-CM

## 2022-08-10 DIAGNOSIS — G57.92 UNSPECIFIED MONONEUROPATHY OF LEFT LOWER LIMB: ICD-10-CM

## 2022-08-10 DIAGNOSIS — M25.872 OTHER SPECIFIED JOINT DISORDERS, LEFT ANKLE AND FOOT: ICD-10-CM

## 2022-08-10 DIAGNOSIS — M21.6X2 OTHER ACQUIRED DEFORMITIES OF LEFT FOOT: ICD-10-CM

## 2022-08-10 DIAGNOSIS — M20.12 HALLUX VALGUS (ACQUIRED), LEFT FOOT: ICD-10-CM

## 2022-08-10 PROCEDURE — 20605 DRAIN/INJ JOINT/BURSA W/O US: CPT | Mod: LT

## 2022-08-10 PROCEDURE — 77002 NEEDLE LOCALIZATION BY XRAY: CPT

## 2022-08-10 PROCEDURE — 99214 OFFICE O/P EST MOD 30 MIN: CPT | Mod: 25

## 2023-02-07 ENCOUNTER — APPOINTMENT (OUTPATIENT)
Dept: ORTHOPEDIC SURGERY | Facility: CLINIC | Age: 48
End: 2023-02-07
Payer: MEDICAID

## 2023-02-07 VITALS
BODY MASS INDEX: 31.65 KG/M2 | OXYGEN SATURATION: 98 % | DIASTOLIC BLOOD PRESSURE: 73 MMHG | HEIGHT: 65 IN | WEIGHT: 190 LBS | SYSTOLIC BLOOD PRESSURE: 118 MMHG | HEART RATE: 72 BPM

## 2023-02-07 PROCEDURE — 99214 OFFICE O/P EST MOD 30 MIN: CPT

## 2023-02-07 NOTE — PHYSICAL EXAM
[de-identified] : General appearance: well nourished and hydrated, pleasant, alert and oriented x 3, cooperative. \par HEENT: normocephalic, EOM intact, wearing mask, external auditory canal clear.  \par Cardiovascular: no lower leg edema, no varicosities, dorsalis pedis pulses palpable and symmetric.  \par Lymphatics: no palpable lymphadenopathy, no lymphedema.  \par Neurologic: sensation is normal, no muscle weakness in upper or lower extremities, patella tendon reflexes present and symmetric.  \par Dermatologic: skin moist, warm, no rash.  \par Spine: cervical spine with normal lordosis and painless range of motion, thoracic spine with normal kyphosis and painless range of motion, lumbosacral spine with normal lordosis and painless range of motion.  No tenderness to palpation along midline spine and paraspinal musculature.  Sacroiliac joints nontender bilaterally. Negative SLR and crossed SLR tests bilaterally.\par Gait: presents with the use of a cane, displays a left foot hindfoot collapse on the standing portion of her gait pattern\par \par Limb lengths: clinically RLE approximately 3-4 mm short compared to the LLE\par \par Left hip:\par - Focal soft tissue swelling: none\par - Ecchymosis: none\par - Erythema: none\par - Wounds: well healed lateral incision, benign appearing \par - Tenderness: none\par - ROM: \par   - Flexion: 100\par   - Extension: 0\par   - Adduction: 30\par   - Abduction: 40\par   - Internal rotation in 90 degrees of hip flexion: 40\par   - External rotation in 90 degrees of hip flexion: 40\par - ED: negative\par - FADIR: negative\par - Kiana: negative\par - Stinchfield: mildly positive\par - Flexor power: 4+/5\par - Abductor power: 4/5\par \par Right hip: \par - Focal soft tissue swelling: none\par - Ecchymosis: none\par - Erythema: none\par - Wounds: none\par - Tenderness: anterior and lateral TTP\par - ROM: \par   - Flexion: 90\par   - Extension: 0\par   - Adduction: 20\par   - Abduction: 20\par   - Internal rotation in 90 degrees of hip flexion: 0\par   - External rotation in 90 degrees of hip flexion: 20\par - ED: positive\par - FADIR: positive\par - Kiana: negative\par - Stinchfield: sharply positive\par - Flexor power: 2/5\par - Abductor power: 4/5

## 2023-02-07 NOTE — END OF VISIT
[FreeTextEntry3] : All medical record entries made by the Scribe were at my, Dr. Kian Jason, direction and personally dictated by me on 02/07/2023. I have reviewed the chart and agree that the record accurately reflects my personal performance of the history, physical exam, assessment and plan. I have also personally directed, reviewed, and agreed with the chart.

## 2023-02-07 NOTE — DISCUSSION/SUMMARY
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 connective tissue disorder. \par - As before, I think that she is a candidate for right total hip arthroplasty whenever she feels ready, however the left foot continues to be the primary pain generator and I do still think it makes sense to address this as a priority. \par - I provided her with a referral to another foot and ankle orthopedist who I believe should accept her insurance and she should attempt to schedule a formal evaluation as soon as possible. \par - In the meantime, she will continue with HEP, Tylenol, and meloxicam prn for pain. \par - She will f/u in 6 months with repeat b/l hip XR. \par - If she decides she would like to pursue a right total hip arthroplasty earlier, then I would consider her to be a candidate at any time. \par - We will also provide her with a letter for housing accommodations either at a garden level or in an elevator accessible building.

## 2023-02-07 NOTE — HISTORY OF PRESENT ILLNESS
[de-identified] : 02/07/2023: 48 y/o female f/u for right hip OA as well as left VERONICA and left knee OA. We last saw each other approximately 6 months ago during which time we were continuing with conservative management measures for the hip. She was also following with Dr. Arrington for her primary pain generator which continues to be her left foot and ankle with flatfoot deformity. She reports that in the interim, she received some local injections to the left hindfoot by Dr. Arrington but has since lost access to him since he lost the practice. She has been trying to pursue other leads for foot and ankle orthopedists without success secondary to insurance coverage issues. The symptoms of all the affected joints remain about the same as during our last visit. The right hip is consistently always painful. She complains of locking when changing positions from seated to standing. She requires the use of bilateral arm pushup to get out of any seated position and she requires the use of a cane at all times. She also complains of a difficult living situation being on a second story walkup building. She has been experiencing progressive difficulty climbing the stairs both up and down and feels that she is at risk for falling down the stairs. \par \par 8/9/22: Following up for right hip and left knee osteoarthritis. The worst pain generator remains the left foot and ankle, currently under management by Dr. Arrington. The right hip is also moderately painful all the time and the source of some limping. Left knee has been nonpainful since the injection given by me last summer. She has been using a left Festus brace per Dr. Arrington since the start of 2022. Left hindfoot surgery has been mentioned but no definitive plans have yet been made.\par \par 6/29/21: 45y/o female presenting for left knee, right foot, and right hip pain. Symptoms all progressive for several years. Left hip was replaced by Dr. Alexis in 2015 and she has no complaints. Left knee with diffuse pain; previously responded to a CSI in 2018. Right flatfoot deformity associated with hindfoot and posteromedial ankle pain, for which she uses a figure of eight brace (doesn't help much). The left foot is also flat and unstable but much less painful. Right hip is less painful than the left knee and right foot. 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 She has moderate low back pain.\par \par PMH sig for asthma and hairy cell leukemia, in remission since 2014.

## 2023-08-08 ENCOUNTER — RESULT REVIEW (OUTPATIENT)
Age: 48
End: 2023-08-08

## 2023-08-08 ENCOUNTER — OUTPATIENT (OUTPATIENT)
Dept: OUTPATIENT SERVICES | Facility: HOSPITAL | Age: 48
LOS: 1 days | End: 2023-08-08
Payer: COMMERCIAL

## 2023-08-08 ENCOUNTER — APPOINTMENT (OUTPATIENT)
Dept: ORTHOPEDIC SURGERY | Facility: CLINIC | Age: 48
End: 2023-08-08
Payer: MEDICAID

## 2023-08-08 VITALS
WEIGHT: 190 LBS | BODY MASS INDEX: 31.65 KG/M2 | HEART RATE: 57 BPM | DIASTOLIC BLOOD PRESSURE: 75 MMHG | HEIGHT: 65 IN | OXYGEN SATURATION: 98 % | SYSTOLIC BLOOD PRESSURE: 188 MMHG

## 2023-08-08 DIAGNOSIS — M16.11 UNILATERAL PRIMARY OSTEOARTHRITIS, RIGHT HIP: ICD-10-CM

## 2023-08-08 DIAGNOSIS — Z87.19 PERSONAL HISTORY OF OTHER DISEASES OF THE DIGESTIVE SYSTEM: ICD-10-CM

## 2023-08-08 PROCEDURE — 71046 X-RAY EXAM CHEST 2 VIEWS: CPT | Mod: 26

## 2023-08-08 PROCEDURE — 73521 X-RAY EXAM HIPS BI 2 VIEWS: CPT

## 2023-08-08 PROCEDURE — 99215 OFFICE O/P EST HI 40 MIN: CPT

## 2023-08-08 PROCEDURE — 73521 X-RAY EXAM HIPS BI 2 VIEWS: CPT | Mod: 26

## 2023-08-08 PROCEDURE — 71046 X-RAY EXAM CHEST 2 VIEWS: CPT

## 2023-08-08 RX ORDER — OMEPRAZOLE 40 MG/1
40 CAPSULE, DELAYED RELEASE ORAL
Qty: 90 | Refills: 3 | Status: ACTIVE | COMMUNITY
Start: 2021-01-08 | End: 1900-01-01

## 2023-08-10 NOTE — ADDENDUM
[FreeTextEntry1] : Documented by Constantine Mariscal acting as a scribe under Dr. Jason. (08/08/2023)

## 2023-08-10 NOTE — PHYSICAL EXAM
[de-identified] : General appearance: well nourished and hydrated, pleasant, alert and oriented x 3, cooperative. HEENT: normocephalic, EOM intact, wearing mask, external auditory canal clear. Cardiovascular: no lower leg edema, no varicosities, dorsalis pedis pulses palpable and symmetric. Lymphatics: no palpable lymphadenopathy, no lymphedema. Neurologic: sensation is normal, no muscle weakness in upper or lower extremities, patella tendon reflexes present and symmetric. Dermatologic: skin moist, warm, no rash. Spine: cervical spine with normal lordosis and painless range of motion, thoracic spine with normal kyphosis and painless range of motion, lumbosacral spine with normal lordosis and painless range of motion.  No tenderness to palpation along midline spine and paraspinal musculature.  Sacroiliac joints nontender bilaterally. Negative SLR and crossed SLR tests bilaterally. Gait: She does present today using a cane as well as a figure of 8 lace right ankle brace. She continues to demonstrate severe left greater than right antalgia as well as valgus collapse of the left ankle and hind foot. It is clearly challenging to her to transition from sitting to supine position.   Limb lengths: clinically RLE approximately 3 mm longer than the left.   Right hip: - Focal soft tissue swelling: none - Ecchymosis: none - Erythema: none - Wounds: none - Tenderness: Diffuse TTP about the hip including the anterior and lateral aspects. - ROM:   - Flexion: 100   - Extension: 0   - Adduction: 20   - Abduction: 20   - Internal rotation in 90 degrees of hip flexion: 0   - External rotation in 90 degrees of hip flexion: 15 - ED: Positive - FADIR: Positive - Kiana: negative - Stinchfield: negative - Flexor power: 2/5 - Abductor power: 5/5 [de-identified] : AP pelvis and bilateral hip radiographs were obtained.  - These demonstrate overall unchanged appearance of severe hip osteoarthritis with proximal femoral deformity as compared to last year's radiographs.  - There remains bone-on-bone superior articulation with cystic and sclerotic changes on both sides of the joint. TONNIS 3.  - Unchanged appearance noted as well of left total hip [id number]. All components appear well fixed in appearance without evidence of mechanical complication.

## 2023-08-10 NOTE — HISTORY OF PRESENT ILLNESS
[de-identified] : 08/08/2023: 47 y/o female following up for left total hip replacement, right hip osteoarthritis, and severe left flat foot. She was last seen six months ago at which time she requested that we continue with conservative management as she was still seeking definitive care of her left flat foot disorder. She reports that in the intervening six months her right hip pain has continued to progress and worsen. She is currently taking turmeric and 30 mg of Meloxicam per day but notes worsening stomach pain with this regiment and still complains that the right hip pain is not well controlled. She's been exercising as much as possible, but this is becoming increasingly difficult. She notes ongoing pain and loss of function secondary to her left flat foot disorder. She did recently see another foot and ankle doctor who told her that her situation was too complex to consider an operation and recommended ongoing brace wear instead. She has been ambulating with a left ankle support brace as well as a cane but notes that basic ambulation even for short distances is becoming more challenging secondary to both the left foot and the right hip. At this point, she would say that they are both equally bothersome to her.  02/07/2023: 48 y/o female f/u for right hip OA as well as left VERONICA and left knee OA. We last saw each other approximately 6 months ago during which time we were continuing with conservative management measures for the hip. She was also following with Dr. Arrington for her primary pain generator which continues to be her left foot and ankle with flatfoot deformity. She reports that in the interim, she received some local injections to the left hindfoot by Dr. Arrington but has since lost access to him since he lost the practice. She has been trying to pursue other leads for foot and ankle orthopedists without success secondary to insurance coverage issues. The symptoms of all the affected joints remain about the same as during our last visit. The right hip is consistently always painful. She complains of locking when changing positions from seated to standing. She requires the use of bilateral arm pushup to get out of any seated position and she requires the use of a cane at all times. She also complains of a difficult living situation being on a second story walkup building. She has been experiencing progressive difficulty climbing the stairs both up and down and feels that she is at risk for falling down the stairs.   8/9/22: Following up for right hip and left knee osteoarthritis. The worst pain generator remains the left foot and ankle, currently under management by Dr. Arrington. The right hip is also moderately painful all the time and the source of some limping. Left knee has been nonpainful since the injection given by me last summer. She has been using a left Festus brace per Dr. Arrington since the start of 2022. Left hindfoot surgery has been mentioned but no definitive plans have yet been made.  6/29/21: 45y/o female presenting for left knee, right foot, and right hip pain. Symptoms all progressive for several years. Left hip was replaced by Dr. Alexis in 2015 and she has no complaints. Left knee with diffuse pain; previously responded to a CSI in 2018. Right flatfoot deformity associated with hindfoot and posteromedial ankle pain, for which she uses a figure of eight brace (doesn't help much). The left foot is also flat and unstable but much less painful. Right hip is less painful than the left knee and right foot. 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 She has moderate low back pain.  PMH sig for asthma and hairy cell leukemia, in remission since 2014.

## 2023-08-10 NOTE — END OF VISIT
[FreeTextEntry3] : Documented by Constantine Mariscal acting as a scribe for Dr. Kian Jason. 08/08/2023   All medical record entries made by the Scribe were at my, Dr. Kian Jason, direction and personally dictated by me on 08/08/2023. I have reviewed the chart and agree that the record accurately reflects my personal performance of the history, physical exam, assessment and plan. I have also personally directed, reviewed, and agreed with the chart.

## 2023-08-10 NOTE — DISCUSSION/SUMMARY
[de-identified] : 49 y/o female with well functioning left hip arthroplasty and severe right hip osteoarthritis in the setting of right proximal femoral deformity as well as severe left flat foot deformity.  - She has indicated at this time for right total hip arthroplasty via posterior approach with Hermes-robotic assistance.  - We discussed the details of the procedure, the expected recovery period, and the expected outcome. We discussed the likelihood of satisfaction after complete recovery, and the potential causes of dissatisfaction. The importance of active patient participation in the rehabilitation protocol was emphasized, along with its influence on short and long-term outcomes. We discussed the risks, benefits, and alternatives of surgery at length. Specific risks of total hip replacement were discussed in detail. We discussed the risk of surgical site complications including but not limited to: surgical site infection, wound healing complications, bone fracture, tendon or ligament injury, neurovascular injury, hemorrhage, postoperative stiffness or instability, persistent pain, limb length discrepancy, and need for reoperation. We discussed surgical blood loss and the possible need for blood transfusion. We discussed the risk of perioperative medical complications, including but not limited to catheter-associated urinary tract infection, venous thromboembolism and other cardiopulmonary complications. We discussed anesthetic options and the risk of anesthesia-related complications. We discussed the potential benefits of surgery including the potential to improve the current clinical condition through operative intervention. I emphasized that there are alternatives to surgical intervention including continued conservative management, though such a course could yield less than optimal results in this particular patient. A model was used to demonstrate the operation and to discuss bearing surfaces of the implants. We discussed implant fixation methods; my plan would be to use fully cementless fixation in this case. We discussed the various surgical approaches to the hip; I think that a posterior approach would be appropriate in this case. The role of the robot was discussed with respect to bone preparation, implant insertion, and intraoperative assessment of limb length and offset.  We discussed the durability of prosthetic hips and limitations related to wear, osteolysis and loosening. All questions were answered to the patient's satisfaction. The patient was given a copy of my preoperative packet with additional information about the procedure. I asked the patient to either call back or schedule a followup appointment for any additional questions or concerns regarding the procedure.  - She'll be booked for the procedure at a convenient time with routine medical clearance.  - I don't have any other specific recommendations for her regarding care of her left ankle and hind foot deformity.  I strongly recommended that she follow-up through her insurance plan to find a participating foot and ankle orthopedist who would be willing to manage her condition. That being said, I do not think that we should continue holding off on treatment of the right hip for the sake of the left foot and ankle given that there has been essentially no progress for the last year and a half.  - Leading up until surgery, we will require a right hip non-contrast CT scan for Hermes-robotic planning purposes.  - I also strongly recommended that she discontinue all NSAIDs until her stomach discomfort has subsided.  - I renewed her prior prescription for Omeprazole and recommend she follow-up with her internist regarding this.  - For pain management in the meantime, I recommended Tylenol as well as Tramadol. A prescription for Tramadol was sent and appropriate use precautions were reviewed.

## 2023-10-06 ENCOUNTER — APPOINTMENT (OUTPATIENT)
Dept: INTERNAL MEDICINE | Facility: CLINIC | Age: 48
End: 2023-10-06
Payer: MEDICAID

## 2023-10-06 ENCOUNTER — NON-APPOINTMENT (OUTPATIENT)
Age: 48
End: 2023-10-06

## 2023-10-06 VITALS
BODY MASS INDEX: 32.15 KG/M2 | WEIGHT: 193 LBS | HEART RATE: 69 BPM | OXYGEN SATURATION: 97 % | SYSTOLIC BLOOD PRESSURE: 116 MMHG | DIASTOLIC BLOOD PRESSURE: 69 MMHG | TEMPERATURE: 98.2 F | HEIGHT: 65 IN

## 2023-10-06 DIAGNOSIS — F32.A DEPRESSION, UNSPECIFIED: ICD-10-CM

## 2023-10-06 DIAGNOSIS — J45.909 UNSPECIFIED ASTHMA, UNCOMPLICATED: ICD-10-CM

## 2023-10-06 PROCEDURE — 93000 ELECTROCARDIOGRAM COMPLETE: CPT | Mod: 59

## 2023-10-06 PROCEDURE — 99204 OFFICE O/P NEW MOD 45 MIN: CPT | Mod: 25

## 2023-10-06 RX ORDER — TOPIRAMATE 100 MG/1
100 TABLET, FILM COATED ORAL
Qty: 30 | Refills: 0 | Status: DISCONTINUED | COMMUNITY
Start: 2021-06-07 | End: 2023-10-06

## 2023-10-06 RX ORDER — FLUOXETINE 20 MG/1
TABLET ORAL
Refills: 0 | Status: ACTIVE | COMMUNITY

## 2023-10-06 RX ORDER — RIMEGEPANT SULFATE 75 MG/75MG
75 TABLET, ORALLY DISINTEGRATING ORAL
Refills: 0 | Status: ACTIVE | COMMUNITY

## 2023-10-06 RX ORDER — ALBUTEROL SULFATE 90 UG/1
108 (90 BASE) AEROSOL, METERED RESPIRATORY (INHALATION)
Refills: 0 | Status: ACTIVE | COMMUNITY

## 2023-10-06 RX ORDER — ERYTHROMYCIN 5 MG/G
5 OINTMENT OPHTHALMIC
Qty: 4 | Refills: 0 | Status: DISCONTINUED | COMMUNITY
Start: 2021-04-05 | End: 2023-10-06

## 2023-10-06 RX ORDER — MELOXICAM 15 MG/1
TABLET ORAL
Refills: 0 | Status: DISCONTINUED | COMMUNITY
End: 2023-10-06

## 2023-10-06 RX ORDER — RISPERIDONE 1 MG/1
1 TABLET, FILM COATED ORAL
Refills: 0 | Status: ACTIVE | COMMUNITY
Start: 2021-05-11

## 2023-10-06 RX ORDER — FLUTICASONE PROPIONATE AND SALMETEROL 500; 50 UG/1; UG/1
500-50 POWDER RESPIRATORY (INHALATION)
Refills: 0 | Status: ACTIVE | COMMUNITY
Start: 2021-06-07

## 2023-10-06 RX ORDER — LIDOCAINE HYDROCHLORIDE 20 MG/ML
2 SOLUTION ORAL; TOPICAL
Qty: 60 | Refills: 0 | Status: DISCONTINUED | COMMUNITY
Start: 2021-06-25 | End: 2023-10-06

## 2023-10-06 RX ORDER — TRAMADOL HYDROCHLORIDE 50 MG/1
50 TABLET, COATED ORAL
Qty: 60 | Refills: 0 | Status: DISCONTINUED | COMMUNITY
Start: 2023-08-08 | End: 2023-10-06

## 2023-10-06 RX ORDER — MONTELUKAST 10 MG/1
10 TABLET, FILM COATED ORAL
Qty: 30 | Refills: 0 | Status: DISCONTINUED | COMMUNITY
Start: 2021-06-07 | End: 2023-10-06

## 2023-10-09 LAB
ALBUMIN SERPL ELPH-MCNC: 4.7 G/DL
ALP BLD-CCNC: 119 U/L
ALT SERPL-CCNC: 16 U/L
ANION GAP SERPL CALC-SCNC: 12 MMOL/L
APTT BLD: 29.2 SEC
AST SERPL-CCNC: 19 U/L
BASOPHILS # BLD AUTO: 0.05 K/UL
BASOPHILS NFR BLD AUTO: 0.9 %
BILIRUB SERPL-MCNC: 0.4 MG/DL
BUN SERPL-MCNC: 15 MG/DL
CALCIUM SERPL-MCNC: 10.8 MG/DL
CHLORIDE SERPL-SCNC: 105 MMOL/L
CO2 SERPL-SCNC: 25 MMOL/L
CREAT SERPL-MCNC: 0.68 MG/DL
EGFR: 106 ML/MIN/1.73M2
EOSINOPHIL # BLD AUTO: 0.25 K/UL
EOSINOPHIL NFR BLD AUTO: 4.6 %
ESTIMATED AVERAGE GLUCOSE: 111 MG/DL
GLUCOSE SERPL-MCNC: 82 MG/DL
HBA1C MFR BLD HPLC: 5.5 %
HCT VFR BLD CALC: 44.5 %
HGB BLD-MCNC: 14.2 G/DL
IMM GRANULOCYTES NFR BLD AUTO: 0.2 %
INR PPP: 0.94 RATIO
LYMPHOCYTES # BLD AUTO: 2.11 K/UL
LYMPHOCYTES NFR BLD AUTO: 39.2 %
MAN DIFF?: NORMAL
MCHC RBC-ENTMCNC: 29.1 PG
MCHC RBC-ENTMCNC: 31.9 GM/DL
MCV RBC AUTO: 91.2 FL
MONOCYTES # BLD AUTO: 0.33 K/UL
MONOCYTES NFR BLD AUTO: 6.1 %
MRSA SPEC QL CULT: NOT DETECTED
NEUTROPHILS # BLD AUTO: 2.63 K/UL
NEUTROPHILS NFR BLD AUTO: 49 %
PLATELET # BLD AUTO: 298 K/UL
POTASSIUM SERPL-SCNC: 4.3 MMOL/L
PROT SERPL-MCNC: 7.5 G/DL
PT BLD: 10.7 SEC
RBC # BLD: 4.88 M/UL
RBC # FLD: 13.3 %
SODIUM SERPL-SCNC: 141 MMOL/L
STAPH AUREUS (SA): NOT DETECTED
WBC # FLD AUTO: 5.38 K/UL

## 2023-10-13 VITALS
HEART RATE: 70 BPM | HEIGHT: 65 IN | DIASTOLIC BLOOD PRESSURE: 70 MMHG | RESPIRATION RATE: 16 BRPM | TEMPERATURE: 98 F | SYSTOLIC BLOOD PRESSURE: 119 MMHG | WEIGHT: 193.35 LBS | OXYGEN SATURATION: 96 %

## 2023-10-13 RX ORDER — POVIDONE-IODINE 5 %
1 AEROSOL (ML) TOPICAL ONCE
Refills: 0 | Status: COMPLETED | OUTPATIENT
Start: 2023-10-16 | End: 2023-10-16

## 2023-10-13 RX ORDER — ACETAMINOPHEN 500 MG/1
500 TABLET ORAL
Qty: 180 | Refills: 1 | Status: ACTIVE | COMMUNITY
Start: 2023-10-13 | End: 1900-01-01

## 2023-10-13 RX ORDER — OXYCODONE 5 MG/1
5 TABLET ORAL
Qty: 50 | Refills: 0 | Status: ACTIVE | COMMUNITY
Start: 2023-10-13 | End: 1900-01-01

## 2023-10-13 RX ORDER — CELECOXIB 100 MG/1
100 CAPSULE ORAL
Qty: 60 | Refills: 1 | Status: ACTIVE | COMMUNITY
Start: 2023-10-13 | End: 1900-01-01

## 2023-10-13 RX ORDER — ASPIRIN ENTERIC COATED TABLETS 81 MG 81 MG/1
81 TABLET, DELAYED RELEASE ORAL
Qty: 60 | Refills: 0 | Status: ACTIVE | COMMUNITY
Start: 2023-10-13 | End: 1900-01-01

## 2023-10-13 RX ORDER — PANTOPRAZOLE 40 MG/1
40 TABLET, DELAYED RELEASE ORAL DAILY
Qty: 30 | Refills: 2 | Status: ACTIVE | COMMUNITY
Start: 2023-10-13 | End: 1900-01-01

## 2023-10-13 NOTE — H&P ADULT - NSHPPHYSICALEXAM_GEN_ALL_CORE
Gen: Alert and oriented, NAD  MSK: Decreased ROM to right hip 2/2 pain     Rest of PE per medical clearance note Gen: Alert and oriented, NAD  MSK: Decreased ROM to right hip 2/2 pain   Skin warm and well perfused, no visible wounds/erythema/ecchymoses  EHL/FHL/TA/GS firing bilateral lower extremities   SLT in tact to distal bilateral lower extremities   DP pulses palpable bilaterally   Remainder of PE per medical clearance note

## 2023-10-13 NOTE — ASU PREOP CHECKLIST - NS PREOP CHK CHLOROHEX WASH
[FreeTextEntry1] : GYN/Ref:  Butch Klein MD\par PCP:  Mallory Garcia MD\par \par Ms. Perea, 38 years old, G0, + smoker, referred by Dr. Klein for an HSIL pap / HPV non 16/18 positive  (8/2/22 Bioreference).    Pt has a history of abnormal pap smears for the past 3-4 years.  Most recent pap in August noted to be HSIL followed by possible high grade lesion on biopsy of the cervix at 10 o'clock.\par \par Denies f/c/n/v/d/abnormal vaginal bleeding, changes to bowel or bladder habits.  Denies unintentional weight loss or gain. Denies post coital bleeding, although she states she has not been sexually active in a long time.  Denies abdominal pain, bloating or any other associated symptoms.\par \par Pathology:\par 8/30/22 Colposcopy ([facility])\par A)  ECC:  Benign endocervical tissue.  No ectocervical tissue.  No intraepithelial lesion.\par B)  Cervix 10 o'clock:  Rare minute fragments of dysplastic mucosa, at least LSIL/YULIYA 1.  Cannot exclude a higher grade lesion.  Examination limited by paucity of abnormal tissue and incomplete mucosal architecture.  \par \par 10/26/19 Colposcopy ([facility])\par A)  ECC:  Fragments of endocervical tissue.  No intraepithelial lesion or malignancy.\par B)  Cervix 1 o'clock:  Fragments of benign endocervical tissue.  No intraepithelial lesion or malignancy.\par \par 12/1/18 Colposcopy ([facility])\par A)  ECC:  Unremarkable endocervical tissue.  No ectocervical (squamous) mucosa. Mainly mucus and polymorphonuclear cells.  No endocervical atypia.  Multiple recut sections examined.\par B)  Cervix, 8 o'clock brush biopsy:  Unremarkable endocervical tissue.  Minute ectocervical (squamous) epithelium with parakaratosis.  No endocervical atypia.  Recut sections examined.  \par \par Pap:\par 8/2/22:  HSIL; +HPV non 16/18\par 11/6/2020:  ASC-H\par \par Of note, patient recently lost her brother, a friend and her dog of 16 years.  She has been going through an emotional time and is being treated for anxiety with clonazepam.  She also lost a brother as a teenager.  \par \par Health Maintenance:\par BMI: 21\par Lifestyle: current everyday smoker.\par Gardasil Vaccine received:  no \par COVID vaccine received:  no \par Mammogram: n/a - will schedule for this year.\par Colonoscopy: n/a - aware screening begins at age 45\par PAP: as above.\par \par 
in ASU:

## 2023-10-13 NOTE — H&P ADULT - NSHPLABSRESULTS_GEN_ALL_CORE
Preop CBC, BMP, PT/PTT/INR, UA within normal limits- reviewed by medical clearance.  Cr: 0.68  Preop EKG: NSR, 75bpm, reviewed by medical clearance.  CXR: Within normal limits, per medical clearance.  A1c: 5.5  Echo: EF: 53%  MRSA/MSSA swab negative

## 2023-10-13 NOTE — ASU PATIENT PROFILE, ADULT - NSICDXPASTSURGICALHX_GEN_ALL_CORE_FT
PAST SURGICAL HISTORY:  Congenital deformity of hip joint surgery x 3 left, x 1 on the right     PAST SURGICAL HISTORY:  Congenital deformity of hip joint surgery x 3 left, x 1 on the right    H/O total hip arthroplasty, left     History of cholecystectomy 2017    History of lumpectomy right

## 2023-10-13 NOTE — ASU PATIENT PROFILE, ADULT - NSICDXPASTMEDICALHX_GEN_ALL_CORE_FT
PAST MEDICAL HISTORY:  Anxiety     Asthma Asthma    Congenital deformity of hip joint Hip dysplasia    Depressive disorder Depression    GERD (gastroesophageal reflux disease)     Hairy cell leukemia remission    Leukemic reticuloendotheliosis, extranodal and solid organ sites Leukemia, hairy cell    Malignant neoplasm of cervix uteri Cervical cancer    Migraine      PAST MEDICAL HISTORY:  Anxiety     Asthma Asthma    Congenital deformity of hip joint Hip dysplasia    Depressive disorder Depression    GERD (gastroesophageal reflux disease)     Hairy cell leukemia remission    Malignant neoplasm of cervix uteri Cervical cancer    Migraine

## 2023-10-13 NOTE — H&P ADULT - HISTORY OF PRESENT ILLNESS
49yo female with right hip pain x    Presents today for elective Right Total Hip Replacement w/ Dr. Jason  47yo female with right hip pain x chronic. Pt denies acute preceding trauma/injury. Pt reports localized right hip pain, states that she cant ambulate more than 1-2 blocks without debilitating pain. She ambulates with a cane for the past 2.5 years. She takes meloxicam as needed for pain control. Reports numbness/tingling of bilateral feet. Reports DVT hx approximately 3-4 years ago that was unprovoked - states she was on short term AC and is no longer on any. Denies CP, SOB, N/V, tactile fevers, calf pain.     Presents today for elective Right Total Hip Replacement w/ Dr. Jason

## 2023-10-13 NOTE — H&P ADULT - NSICDXPASTMEDICALHX_GEN_ALL_CORE_FT
PAST MEDICAL HISTORY:  Anxiety     Asthma Asthma    Congenital deformity of hip joint Hip dysplasia    Depressive disorder Depression    GERD (gastroesophageal reflux disease)     Hairy cell leukemia remission    Leukemic reticuloendotheliosis, extranodal and solid organ sites Leukemia, hairy cell    Malignant neoplasm of cervix uteri Cervical cancer    Migraine

## 2023-10-13 NOTE — H&P ADULT - PROBLEM SELECTOR PLAN 1
[facility] ENCOUNTER  SURGERY CONSULT NOTE    REQUESTING PROVIDER:  Jesus Haynes MD    CHIEF COMPLAINT: Left breast calcifications on mammogram.     HISTORY OF PRESENT ILLNESS:   Yarelis Nolan is a 54 year old woman seen today at the request of Jesus Haynes and breast care coordinator for left breast calcifications.       She denies pain or tenderness in the area that corresponds to the finding on diagnostic mammogram.  She cannot feel a lump.   She has no major medical complaints, specifically heart problems. She notes some right breast tenderness intermittently.   Prior to recent mammography, she states she has not had a screening mammogram for quite awhile. She has right breast pain intermittently     She had a routine screening mammography on 2017 followed by diagnostic mammography on 17 which showed microcalcifications in the superior lateral portion of her left breast. Microcalcifications are described as clustered pleomorphic and a BI-RADS 4 classification was assigned, and biopsy was suggested.     She has not had a breast biopsy in the past.  She does have breast pain intermittently in the right breast. She cannot feel any nodules.  She has not noticed any nipple discharge.  She had a hysterectomy in  .  Menarche was at age 10 or 11 and she is postmenopausal. She is  1 para 1 with first pregnancy at age 21.  She did not breastfeed. She has taken birth control. She has not taken hormone replacement. . Family history is negative for immediate family history of breast cancer.  Her Stephie Risk Model is 1.1% five-year risk and 7.2% lifetime risk.    PAST MEDICAL HISTORY:    Obesity                                                       Migraine headache                                             Ventricular tachycardia with negative cath in     Hypertension                                                  Irritable bowel syndrome                                       Hemorrhoids                                                   Anxiety Disorder                                              Depression                                                    Hyperlipidemia                                                GERD                                                          Agoraphobia                                                   PAST SURGICAL HISTORY:    Hysterectomy                                   11/11/2008    MILAGROS w BSO    MEDICATIONS:   butalbital-APAP-caffeine (FIORICET) -40 MG per tablet Take 1-2 every 4-6 hours as needed for severe HA. There must be 30 days between refills- PS [place]   • omeprazole (PRILOSEC) 20 MG capsule Take 1 capsule by mouth daily. Please call for Medicare Wellness visit prior to further refills   • fluticasone (FLONASE) 50 MCG/ACT nasal spray Spray 2 sprays in each nostril daily.     ALLERGIES:  Amoxicillin (reaction anaphylaxis), sulfate, buspirone, citalopram.    SOCIAL HISTORY:  Patient  reports that she has been smoking.  She has a 15 pack-year smoking history.  She reports that she does not drink alcohol or use illicit drugs.    FAMILY HISTORY:  Family history includes hypertension in her father and mother; stroke in her father and mother.    REVIEW OF SYSTEMS:    Eye Problem(s):visual blurring, double vision and glasses or contacts  ENT Problem(s):headaches, sinus trouble  Cardiovascular problem(s):negative  Respiratory problem(s):negative  Gastro-intestinal problem(s):diarrhea, dyspepsia and heartburn or reflux  Genito-urinary problem(s):negative  Musculoskeletal problem(s):negative  Integumentary problem(s):negative  Neurological problem(s):migraine headaches  Psychiatric problem(s):anxiety and depression  Endocrine problem(s):negative  Hematologic and/or Lymphatic problem(s):negative    PHYSICAL EXAM:    Visit Vitals   • /74   • Pulse 80   • Ht 4' 10\" (1.473 m)   • Wt 70.3 kg   • BMI 32.4 kg/m2     Constitutional:  The  patient is alert, oriented and cooperative.   Integument:  Warm. Dry. No erythema. No rash.    HENT:  Normocephalic. Atraumatic.   Neck: Normal range of motion. No tenderness. Supple. No stridor.                Chest:   Symmetrical without skin changes or subcutaneous masses.   Breasts: Right breast evaluation shows a normal axillary exam without adenopathy. Arm has full range of motion and no edema. Right breast is nontender and there are no masses. The nipple is everted and there is no drainage. Left breast evaluation shows a normal axillary exam without adenopathy. Arm has full range of motion and no edema. The left breast is nontender and there are no masses. The nipple is everted and there is no drainage. The inframammary area bilaterally was normal.    LABORATORY DATA:    Lab Results   Component Value Date    SODIUM 140 02/03/2017    POTASSIUM 3.5 02/03/2017    CHLORIDE 102 02/03/2017    CO2 24 02/03/2017    BUN 13 02/03/2017    CREATININE 0.61 02/03/2017    GLUCOSE 113 (H) 02/03/2017    WBC 8.3 02/03/2017    HCT 45.0 02/03/2017    HGB 15.7 (H) 02/03/2017     02/03/2017    AST 25 02/03/2017    GPT 35 02/03/2017    ALKPT 155 (H) 02/03/2017    BILIRUBIN 0.4 02/03/2017      INR (no units)   Date Value   03/11/2016 1.0     TSH (mcUnits/mL)   Date Value   03/11/2016 0.645      Lab Results   Component Value Date    COL YELLOW 02/03/2017    UAPP CLOUDY 02/03/2017    USPG >1.030 (H) 02/03/2017    UPH 5.5 02/03/2017    UPROT 30 (A) 02/03/2017    UGLU NEGATIVE 02/03/2017    UKET 40 (A) 02/03/2017    UBILI NEGATIVE 02/03/2017    URBC NEGATIVE 02/03/2017    UNITR NEGATIVE 02/03/2017    UROB 0.2 02/03/2017    UWBC NEGATIVE 02/03/2017      ASSESSMENT AND PLAN:  Diagnostic mammogram performed showed new pleomorphic cluster of microcalcifications in the left breast. I reviewed the mammograms in detail and agree with the mammographic interpretation and BI-RADS 4 classification. I suggested a left sided stereotactic  breast biopsy based on the fact that there is about an 7-10% chance of breast cancer. This procedure was explained in detail including risks which include a 1% chance missed or discordant biopsy and risk of local wound complications and bruising. Ms. Nolan would like to proceed, and this will be performed today following my examination.    Instructions provided as documented in the after visit summary.    The patient indicated understanding of the diagnosis and agreed with the plan of care.     This is Mary Ayala, acting as a scribe for Yovany Glynn MD. The above note represents Yovany Glynn MD evaluation of this patient.  Mary Ayala I, Yovany Glynn MD, attest that I performed all of the work during this encounter and that the scribe only recorded my findings.  Yovany Glynn MD               Admit to Orthopaedic Service.  Presents today for elective Right THR, posterior approach  Pt medically stable and cleared for procedure today by Dr. Crespo (med) and Dr. Weller ([facility])

## 2023-10-15 ENCOUNTER — TRANSCRIPTION ENCOUNTER (OUTPATIENT)
Age: 48
End: 2023-10-15

## 2023-10-16 ENCOUNTER — RESULT REVIEW (OUTPATIENT)
Age: 48
End: 2023-10-16

## 2023-10-16 ENCOUNTER — APPOINTMENT (OUTPATIENT)
Dept: ORTHOPEDIC SURGERY | Facility: HOSPITAL | Age: 48
End: 2023-10-16

## 2023-10-16 ENCOUNTER — TRANSCRIPTION ENCOUNTER (OUTPATIENT)
Age: 48
End: 2023-10-16

## 2023-10-16 ENCOUNTER — INPATIENT (INPATIENT)
Facility: HOSPITAL | Age: 48
LOS: 2 days | Discharge: HOME HEALTH | DRG: 470 | End: 2023-10-19
Attending: ORTHOPAEDIC SURGERY | Admitting: ORTHOPAEDIC SURGERY
Payer: COMMERCIAL

## 2023-10-16 DIAGNOSIS — Z90.49 ACQUIRED ABSENCE OF OTHER SPECIFIED PARTS OF DIGESTIVE TRACT: Chronic | ICD-10-CM

## 2023-10-16 DIAGNOSIS — J45.909 UNSPECIFIED ASTHMA, UNCOMPLICATED: ICD-10-CM

## 2023-10-16 DIAGNOSIS — Z96.642 PRESENCE OF LEFT ARTIFICIAL HIP JOINT: Chronic | ICD-10-CM

## 2023-10-16 DIAGNOSIS — Z98.890 OTHER SPECIFIED POSTPROCEDURAL STATES: Chronic | ICD-10-CM

## 2023-10-16 DIAGNOSIS — C91.40 HAIRY CELL LEUKEMIA NOT HAVING ACHIEVED REMISSION: ICD-10-CM

## 2023-10-16 DIAGNOSIS — Z86.59 PERSONAL HISTORY OF OTHER MENTAL AND BEHAVIORAL DISORDERS: ICD-10-CM

## 2023-10-16 DIAGNOSIS — M16.11 UNILATERAL PRIMARY OSTEOARTHRITIS, RIGHT HIP: ICD-10-CM

## 2023-10-16 PROCEDURE — S2900 ROBOTIC SURGICAL SYSTEM: CPT | Mod: NC

## 2023-10-16 PROCEDURE — 72170 X-RAY EXAM OF PELVIS: CPT | Mod: 26

## 2023-10-16 PROCEDURE — 27130 TOTAL HIP ARTHROPLASTY: CPT | Mod: RT

## 2023-10-16 PROCEDURE — 0055T BONE SRGRY CMPTR CT/MRI IMAG: CPT

## 2023-10-16 DEVICE — LINER ACET TRIDENT X3 0 DEG 36MM D: Type: IMPLANTABLE DEVICE | Site: RIGHT | Status: FUNCTIONAL

## 2023-10-16 DEVICE — SHELL ACET TRIDENT II D 48MM: Type: IMPLANTABLE DEVICE | Site: RIGHT | Status: FUNCTIONAL

## 2023-10-16 DEVICE — STEM ACC V40 127 DEG SZ 3 30X102MM 127 DEG: Type: IMPLANTABLE DEVICE | Site: RIGHT | Status: FUNCTIONAL

## 2023-10-16 DEVICE — CELLERATE SURGICAL POWDER RX 5GM: Type: IMPLANTABLE DEVICE | Site: RIGHT | Status: FUNCTIONAL

## 2023-10-16 DEVICE — MAKO CHECKPOINT 3.5MM HEX IMPACTION: Type: IMPLANTABLE DEVICE | Site: RIGHT | Status: FUNCTIONAL

## 2023-10-16 DEVICE — MAKO BONE PIN 4MM X 140MM: Type: IMPLANTABLE DEVICE | Site: RIGHT | Status: FUNCTIONAL

## 2023-10-16 DEVICE — HEAD FEM CER V40 36MM  PLUS 0MM: Type: IMPLANTABLE DEVICE | Site: RIGHT | Status: FUNCTIONAL

## 2023-10-16 RX ORDER — APREPITANT 80 MG/1
40 CAPSULE ORAL ONCE
Refills: 0 | Status: COMPLETED | OUTPATIENT
Start: 2023-10-16 | End: 2023-10-16

## 2023-10-16 RX ORDER — OXYCODONE HYDROCHLORIDE 5 MG/1
5 TABLET ORAL EVERY 4 HOURS
Refills: 0 | Status: DISCONTINUED | OUTPATIENT
Start: 2023-10-16 | End: 2023-10-19

## 2023-10-16 RX ORDER — ONDANSETRON 8 MG/1
4 TABLET, FILM COATED ORAL EVERY 4 HOURS
Refills: 0 | Status: DISCONTINUED | OUTPATIENT
Start: 2023-10-16 | End: 2023-10-19

## 2023-10-16 RX ORDER — RISPERIDONE 4 MG/1
1 TABLET ORAL
Refills: 0 | DISCHARGE

## 2023-10-16 RX ORDER — FLUTICASONE PROPIONATE AND SALMETEROL 50; 250 UG/1; UG/1
1 POWDER ORAL; RESPIRATORY (INHALATION)
Refills: 0 | DISCHARGE

## 2023-10-16 RX ORDER — CELECOXIB 200 MG/1
400 CAPSULE ORAL ONCE
Refills: 0 | Status: COMPLETED | OUTPATIENT
Start: 2023-10-16 | End: 2023-10-16

## 2023-10-16 RX ORDER — CHLORHEXIDINE GLUCONATE 213 G/1000ML
1 SOLUTION TOPICAL ONCE
Refills: 0 | Status: COMPLETED | OUTPATIENT
Start: 2023-10-16 | End: 2023-10-16

## 2023-10-16 RX ORDER — ACETAMINOPHEN 500 MG
1000 TABLET ORAL ONCE
Refills: 0 | Status: COMPLETED | OUTPATIENT
Start: 2023-10-16 | End: 2023-10-16

## 2023-10-16 RX ORDER — PANTOPRAZOLE SODIUM 20 MG/1
40 TABLET, DELAYED RELEASE ORAL
Refills: 0 | Status: DISCONTINUED | OUTPATIENT
Start: 2023-10-16 | End: 2023-10-19

## 2023-10-16 RX ORDER — BUDESONIDE AND FORMOTEROL FUMARATE DIHYDRATE 160; 4.5 UG/1; UG/1
2 AEROSOL RESPIRATORY (INHALATION)
Refills: 0 | Status: DISCONTINUED | OUTPATIENT
Start: 2023-10-16 | End: 2023-10-16

## 2023-10-16 RX ORDER — RIMEGEPANT SULFATE 75 MG/75MG
1 TABLET, ORALLY DISINTEGRATING ORAL
Refills: 0 | DISCHARGE

## 2023-10-16 RX ORDER — RISPERIDONE 4 MG/1
1 TABLET ORAL AT BEDTIME
Refills: 0 | Status: DISCONTINUED | OUTPATIENT
Start: 2023-10-16 | End: 2023-10-16

## 2023-10-16 RX ORDER — HYDROMORPHONE HYDROCHLORIDE 2 MG/ML
0.5 INJECTION INTRAMUSCULAR; INTRAVENOUS; SUBCUTANEOUS
Refills: 0 | Status: DISCONTINUED | OUTPATIENT
Start: 2023-10-16 | End: 2023-10-19

## 2023-10-16 RX ORDER — SENNA PLUS 8.6 MG/1
2 TABLET ORAL AT BEDTIME
Refills: 0 | Status: DISCONTINUED | OUTPATIENT
Start: 2023-10-16 | End: 2023-10-19

## 2023-10-16 RX ORDER — ALBUTEROL 90 UG/1
2 AEROSOL, METERED ORAL
Refills: 0 | Status: DISCONTINUED | OUTPATIENT
Start: 2023-10-16 | End: 2023-10-16

## 2023-10-16 RX ORDER — ASPIRIN/CALCIUM CARB/MAGNESIUM 324 MG
81 TABLET ORAL
Refills: 0 | Status: DISCONTINUED | OUTPATIENT
Start: 2023-10-16 | End: 2023-10-19

## 2023-10-16 RX ORDER — OXYCODONE HYDROCHLORIDE 5 MG/1
10 TABLET ORAL EVERY 4 HOURS
Refills: 0 | Status: DISCONTINUED | OUTPATIENT
Start: 2023-10-16 | End: 2023-10-19

## 2023-10-16 RX ORDER — HYDROMORPHONE HYDROCHLORIDE 2 MG/ML
0.5 INJECTION INTRAMUSCULAR; INTRAVENOUS; SUBCUTANEOUS
Refills: 0 | Status: COMPLETED | OUTPATIENT
Start: 2023-10-16 | End: 2023-10-23

## 2023-10-16 RX ORDER — SODIUM CHLORIDE 9 MG/ML
1000 INJECTION, SOLUTION INTRAVENOUS
Refills: 0 | Status: DISCONTINUED | OUTPATIENT
Start: 2023-10-16 | End: 2023-10-19

## 2023-10-16 RX ORDER — CEFAZOLIN SODIUM 1 G
2000 VIAL (EA) INJECTION EVERY 8 HOURS
Refills: 0 | Status: COMPLETED | OUTPATIENT
Start: 2023-10-16 | End: 2023-10-17

## 2023-10-16 RX ORDER — POLYETHYLENE GLYCOL 3350 17 G/17G
17 POWDER, FOR SOLUTION ORAL AT BEDTIME
Refills: 0 | Status: DISCONTINUED | OUTPATIENT
Start: 2023-10-16 | End: 2023-10-19

## 2023-10-16 RX ORDER — FLUOXETINE HCL 10 MG
10 CAPSULE ORAL DAILY
Refills: 0 | Status: DISCONTINUED | OUTPATIENT
Start: 2023-10-16 | End: 2023-10-16

## 2023-10-16 RX ORDER — CELECOXIB 200 MG/1
200 CAPSULE ORAL EVERY 12 HOURS
Refills: 0 | Status: DISCONTINUED | OUTPATIENT
Start: 2023-10-18 | End: 2023-10-19

## 2023-10-16 RX ADMIN — Medication 1000 MILLIGRAM(S): at 10:38

## 2023-10-16 RX ADMIN — Medication 1 APPLICATION(S): at 10:44

## 2023-10-16 RX ADMIN — APREPITANT 40 MILLIGRAM(S): 80 CAPSULE ORAL at 10:38

## 2023-10-16 RX ADMIN — SODIUM CHLORIDE 150 MILLILITER(S): 9 INJECTION, SOLUTION INTRAVENOUS at 16:00

## 2023-10-16 RX ADMIN — CHLORHEXIDINE GLUCONATE 1 APPLICATION(S): 213 SOLUTION TOPICAL at 10:44

## 2023-10-16 RX ADMIN — Medication 81 MILLIGRAM(S): at 19:12

## 2023-10-16 RX ADMIN — OXYCODONE HYDROCHLORIDE 10 MILLIGRAM(S): 5 TABLET ORAL at 21:32

## 2023-10-16 RX ADMIN — OXYCODONE HYDROCHLORIDE 5 MILLIGRAM(S): 5 TABLET ORAL at 16:00

## 2023-10-16 RX ADMIN — Medication 100 MILLIGRAM(S): at 19:12

## 2023-10-16 RX ADMIN — OXYCODONE HYDROCHLORIDE 10 MILLIGRAM(S): 5 TABLET ORAL at 20:31

## 2023-10-16 RX ADMIN — CELECOXIB 400 MILLIGRAM(S): 200 CAPSULE ORAL at 10:38

## 2023-10-16 NOTE — PHYSICAL THERAPY INITIAL EVALUATION ADULT - ADDITIONAL COMMENTS
Pt states she lives in walk up apartment with 24 year old son (20 steps to apartment). Pt has SC she uses for amb also owns RW and rollator. Pt was independent with ADLs PTA

## 2023-10-16 NOTE — PRE-ANESTHESIA EVALUATION ADULT - NSANTHOSAYNRD_GEN_A_CORE
No. MYESHA screening performed.  STOP BANG Legend: 0-2 = LOW Risk; 3-4 = INTERMEDIATE Risk; 5-8 = HIGH Risk

## 2023-10-16 NOTE — PROGRESS NOTE ADULT - SUBJECTIVE AND OBJECTIVE BOX
Ortho Post Op Check    Procedure: R natalie  Surgeon: Oh    Pt comfortable without complaints, pain controlled  Denies CP, SOB, N/V, numbness/tingling     Vital Signs Last 24 Hrs  T(C): 36.4 (10-16-23 @ 20:15), Max: 36.6 (10-16-23 @ 16:55)  T(F): 97.6 (10-16-23 @ 20:15), Max: 97.9 (10-16-23 @ 16:55)  HR: 69 (10-16-23 @ 20:15) (58 - 69)  BP: 112/66 (10-16-23 @ 20:15) (91/56 - 112/66)  BP(mean): 68 (10-16-23 @ 16:55) (68 - 70)  RR: 16 (10-16-23 @ 20:15) (16 - 18)  SpO2: 97% (10-16-23 @ 20:15) (97% - 99%)  I&O's Summary    16 Oct 2023 07:01  -  16 Oct 2023 21:59  --------------------------------------------------------  IN: 1150 mL / OUT: 800 mL / NET: 350 mL        General: Pt Alert and oriented, NAD  DSG C/D/I aquacell w/ prineo; tege, xeroform, gauze  B/L LE: sensation intact, DP 2+, brisk cap refill, EHL/FHL/TA/GS 5/5                Post-op X-Ray: hardware well aligned, no fxs    A/P: 48yFemale POD#0 s/p R natalie 10/16  - Stable  - Pain Control  - DVT ppx: asa 81 bid  - Post op abx: ancef  - PT, WBS: wbat posterior hip precautions  Dispo: pt eval    Ortho Pager [phone]

## 2023-10-16 NOTE — PHYSICAL THERAPY INITIAL EVALUATION ADULT - PERTINENT HX OF CURRENT PROBLEM, REHAB EVAL
49yo female with right hip pain x chronic. Pt denies acute preceding trauma/injury. Pt reports localized right hip pain, states that she cant ambulate more than 1-2 blocks without debilitating pain. She ambulates with a cane for the past 2.5 years. She takes meloxicam as needed for pain control. Reports numbness/tingling of bilateral feet. Reports DVT hx approximately 3-4 years ago that was unprovoked - states she was on short term AC and is no longer on any. Denies CP, SOB, N/V, tactile fevers, calf pain.     Presents today for elective Right Total Hip Replacement w/ Dr. Jason

## 2023-10-16 NOTE — PHYSICAL THERAPY INITIAL EVALUATION ADULT - GENERAL OBSERVATIONS, REHAB EVAL
Pt received semi supine in bed +hep lock +abd pillow +SCD. RN Deirdre aware of intent to treat. pt is POD #0 R THR tolerated session well amb with RW CGA educated on post hip precautions. Pt was left as received with call bell in reach ,VSS, and in NAD.

## 2023-10-17 ENCOUNTER — TRANSCRIPTION ENCOUNTER (OUTPATIENT)
Age: 48
End: 2023-10-17

## 2023-10-17 LAB
ANION GAP SERPL CALC-SCNC: 4 MMOL/L — LOW (ref 5–17)
ANION GAP SERPL CALC-SCNC: 4 MMOL/L — LOW (ref 5–17)
APPEARANCE UR: CLEAR — SIGNIFICANT CHANGE UP
BILIRUB UR-MCNC: NEGATIVE — SIGNIFICANT CHANGE UP
BUN SERPL-MCNC: 12 MG/DL — SIGNIFICANT CHANGE UP (ref 7–23)
BUN SERPL-MCNC: 12 MG/DL — SIGNIFICANT CHANGE UP (ref 7–23)
CALCIUM SERPL-MCNC: 9.5 MG/DL — SIGNIFICANT CHANGE UP (ref 8.4–10.5)
CALCIUM SERPL-MCNC: 9.5 MG/DL — SIGNIFICANT CHANGE UP (ref 8.4–10.5)
CHLORIDE SERPL-SCNC: 106 MMOL/L — SIGNIFICANT CHANGE UP (ref 96–108)
CHLORIDE SERPL-SCNC: 106 MMOL/L — SIGNIFICANT CHANGE UP (ref 96–108)
CO2 SERPL-SCNC: 25 MMOL/L — SIGNIFICANT CHANGE UP (ref 22–31)
CO2 SERPL-SCNC: 25 MMOL/L — SIGNIFICANT CHANGE UP (ref 22–31)
COLOR SPEC: YELLOW — SIGNIFICANT CHANGE UP
CREAT SERPL-MCNC: 0.58 MG/DL — SIGNIFICANT CHANGE UP (ref 0.5–1.3)
CREAT SERPL-MCNC: 0.58 MG/DL — SIGNIFICANT CHANGE UP (ref 0.5–1.3)
DIFF PNL FLD: NEGATIVE — SIGNIFICANT CHANGE UP
EGFR: 110 ML/MIN/1.73M2 — SIGNIFICANT CHANGE UP
EGFR: 110 ML/MIN/1.73M2 — SIGNIFICANT CHANGE UP
GLUCOSE SERPL-MCNC: 150 MG/DL — HIGH (ref 70–99)
GLUCOSE SERPL-MCNC: 150 MG/DL — HIGH (ref 70–99)
GLUCOSE UR QL: 100
GLUCOSE UR QL: 100
GLUCOSE UR QL: NEGATIVE — SIGNIFICANT CHANGE UP
GLUCOSE UR QL: NEGATIVE — SIGNIFICANT CHANGE UP
HCT VFR BLD CALC: 32.3 % — LOW (ref 34.5–45)
HCT VFR BLD CALC: 32.3 % — LOW (ref 34.5–45)
HGB BLD-MCNC: 10.5 G/DL — LOW (ref 11.5–15.5)
HGB BLD-MCNC: 10.5 G/DL — LOW (ref 11.5–15.5)
KETONES UR-MCNC: NEGATIVE — SIGNIFICANT CHANGE UP
LEUKOCYTE ESTERASE UR-ACNC: NEGATIVE — SIGNIFICANT CHANGE UP
MCHC RBC-ENTMCNC: 29.1 PG — SIGNIFICANT CHANGE UP (ref 27–34)
MCHC RBC-ENTMCNC: 29.1 PG — SIGNIFICANT CHANGE UP (ref 27–34)
MCHC RBC-ENTMCNC: 32.5 GM/DL — SIGNIFICANT CHANGE UP (ref 32–36)
MCHC RBC-ENTMCNC: 32.5 GM/DL — SIGNIFICANT CHANGE UP (ref 32–36)
MCV RBC AUTO: 89.5 FL — SIGNIFICANT CHANGE UP (ref 80–100)
MCV RBC AUTO: 89.5 FL — SIGNIFICANT CHANGE UP (ref 80–100)
NITRITE UR-MCNC: NEGATIVE — SIGNIFICANT CHANGE UP
NRBC # BLD: 0 /100 WBCS — SIGNIFICANT CHANGE UP (ref 0–0)
NRBC # BLD: 0 /100 WBCS — SIGNIFICANT CHANGE UP (ref 0–0)
PH UR: 6 — SIGNIFICANT CHANGE UP (ref 5–8)
PLATELET # BLD AUTO: 194 K/UL — SIGNIFICANT CHANGE UP (ref 150–400)
PLATELET # BLD AUTO: 194 K/UL — SIGNIFICANT CHANGE UP (ref 150–400)
POTASSIUM SERPL-MCNC: 4.2 MMOL/L — SIGNIFICANT CHANGE UP (ref 3.5–5.3)
POTASSIUM SERPL-MCNC: 4.2 MMOL/L — SIGNIFICANT CHANGE UP (ref 3.5–5.3)
POTASSIUM SERPL-SCNC: 4.2 MMOL/L — SIGNIFICANT CHANGE UP (ref 3.5–5.3)
POTASSIUM SERPL-SCNC: 4.2 MMOL/L — SIGNIFICANT CHANGE UP (ref 3.5–5.3)
PROT UR-MCNC: NEGATIVE MG/DL — SIGNIFICANT CHANGE UP
RBC # BLD: 3.61 M/UL — LOW (ref 3.8–5.2)
RBC # BLD: 3.61 M/UL — LOW (ref 3.8–5.2)
RBC # FLD: 12.6 % — SIGNIFICANT CHANGE UP (ref 10.3–14.5)
RBC # FLD: 12.6 % — SIGNIFICANT CHANGE UP (ref 10.3–14.5)
SODIUM SERPL-SCNC: 135 MMOL/L — SIGNIFICANT CHANGE UP (ref 135–145)
SODIUM SERPL-SCNC: 135 MMOL/L — SIGNIFICANT CHANGE UP (ref 135–145)
SP GR SPEC: 1.02 — SIGNIFICANT CHANGE UP (ref 1–1.03)
UROBILINOGEN FLD QL: 0.2 E.U./DL — SIGNIFICANT CHANGE UP
WBC # BLD: 12.15 K/UL — HIGH (ref 3.8–10.5)
WBC # BLD: 12.15 K/UL — HIGH (ref 3.8–10.5)
WBC # FLD AUTO: 12.15 K/UL — HIGH (ref 3.8–10.5)
WBC # FLD AUTO: 12.15 K/UL — HIGH (ref 3.8–10.5)

## 2023-10-17 PROCEDURE — 99222 1ST HOSP IP/OBS MODERATE 55: CPT

## 2023-10-17 RX ORDER — SODIUM CHLORIDE 9 MG/ML
1000 INJECTION INTRAMUSCULAR; INTRAVENOUS; SUBCUTANEOUS ONCE
Refills: 0 | Status: COMPLETED | OUTPATIENT
Start: 2023-10-17 | End: 2023-10-17

## 2023-10-17 RX ORDER — ALBUTEROL 90 UG/1
1 AEROSOL, METERED ORAL DAILY
Refills: 0 | Status: DISCONTINUED | OUTPATIENT
Start: 2023-10-17 | End: 2023-10-19

## 2023-10-17 RX ORDER — FLUOXETINE HCL 10 MG
10 CAPSULE ORAL DAILY
Refills: 0 | Status: DISCONTINUED | OUTPATIENT
Start: 2023-10-17 | End: 2023-10-19

## 2023-10-17 RX ADMIN — Medication 81 MILLIGRAM(S): at 17:14

## 2023-10-17 RX ADMIN — OXYCODONE HYDROCHLORIDE 10 MILLIGRAM(S): 5 TABLET ORAL at 10:48

## 2023-10-17 RX ADMIN — SODIUM CHLORIDE 1000 MILLILITER(S): 9 INJECTION INTRAMUSCULAR; INTRAVENOUS; SUBCUTANEOUS at 10:20

## 2023-10-17 RX ADMIN — Medication 10 MILLIGRAM(S): at 13:37

## 2023-10-17 RX ADMIN — OXYCODONE HYDROCHLORIDE 10 MILLIGRAM(S): 5 TABLET ORAL at 09:48

## 2023-10-17 RX ADMIN — PANTOPRAZOLE SODIUM 40 MILLIGRAM(S): 20 TABLET, DELAYED RELEASE ORAL at 05:17

## 2023-10-17 RX ADMIN — OXYCODONE HYDROCHLORIDE 10 MILLIGRAM(S): 5 TABLET ORAL at 22:10

## 2023-10-17 RX ADMIN — OXYCODONE HYDROCHLORIDE 10 MILLIGRAM(S): 5 TABLET ORAL at 23:00

## 2023-10-17 RX ADMIN — Medication 1 TABLET(S): at 13:37

## 2023-10-17 RX ADMIN — Medication 81 MILLIGRAM(S): at 05:17

## 2023-10-17 RX ADMIN — Medication 100 MILLIGRAM(S): at 04:06

## 2023-10-17 RX ADMIN — SENNA PLUS 2 TABLET(S): 8.6 TABLET ORAL at 22:10

## 2023-10-17 NOTE — CONSULT NOTE ADULT - ASSESSMENT
48 YOF with PMH of hairy cell leukemia s/p chemo in remission, NAFLD, anxiety/depression, asthma, CBP, GERD, migraines admitted for elective R VERONICA s/p OR with Dr. Jason on 10/16.     Right hip pain s/p VERONICA  - management per ortho, s/p OR with Dr. Jason on 10/16  - pain control with bowel regimen, frequent perioperative incentive spirometer use  - consider chemical DVT ppx with LMWH or DOAC    Dysuria  - obtain UA    Leukocytosis  - afebrile, no s/s infection  - likely reactive 2/2 surgery  - monitor off antibiotics    Acute blood loss anemia  - Hgb 14.2 --> 10.5, HDS  - asymptomatic w/o active s/s bleeding  - likely 2/2 operative losses      Hairy cell leukemia  - s/p chemo in remission    Anxiety/depression  - cont home fluoxetine 10mg, risperidone PRN    Asthma  - home: Wixela, ProAir  - inpatient: symbicort, albuterol     GERD  - home omeprazole --> pantoprazole 40mg    Migraines  - nurtec 75mg PRN     Dipso: home with HPT pending clearance    Plan discussed with primary team.

## 2023-10-17 NOTE — PROGRESS NOTE ADULT - SUBJECTIVE AND OBJECTIVE BOX
Ortho Note    Subjective:  Patient seen and examined today, patient reporting surgical site pain, pain controlled with current pain medication regimen,   Denies CP, SOB, N/V, numbness/tingling   Reviewed plan of care with patient at bedside  Patient requesting to ho hold respiradone 1mg PO - she states she takes it at bedtime as a sleep aid and does not want it in the hospital.     Vital Signs Last 24 Hrs  T(C): 36.7 (10-17-23 @ 08:10), Max: 36.9 (10-17-23 @ 05:17)  T(F): 98 (10-17-23 @ 08:10), Max: 98.4 (10-17-23 @ 05:17)  HR: 63 (10-17-23 @ 08:10) (63 - 63)  BP: 107/67 (10-17-23 @ 08:10) (97/57 - 107/67)  BP(mean): --  RR: 17 (10-17-23 @ 08:10) (17 - 18)  SpO2: 97% (10-17-23 @ 08:10) (96% - 97%)  AVSS    Objective:    Physical Exam:  General: Pt Alert and oriented, NAD  Right hip aquacel DSG C/D/I- adduction pillow in place  Pulses: +2 pedal pulses, wwp toes  Sensation: silt intact bilateral lower extremities  Motor: EHL/FHL/TA/GS- 5/5 bilateral lower extremities        Plan of Care:  A/P: 48yFemale POD#1 s/p R VERONICA  - afebrile  - Pain Control- celebrex 200mg PO BID, Dilaudid 0.5mg Q3h prn breakthrough pain, Oxycodone 5-10mg PO Q4h prn moderate to severe pain   - DVT ppx: aspirin 81mg PO BID  - PT, WBS: WBAT posterior hip precautions  - appreciate medicine recs  -bowel regimen, IS use, PPI   - normal saline bolus for blood pressure  systolic  - Dispo- home pending pt clearance      Ortho Pager [phone] Ortho Note    Subjective:  Patient seen and examined today, patient reporting surgical site pain, pain controlled with current pain medication regimen,   Denies CP, SOB, N/V, numbness/tingling   Reviewed plan of care with patient at bedside  Patient requesting to ho hold respiradone 1mg PO - she states she takes it at bedtime as a sleep aid and does not want it in the hospital.  Patient reports taking nurtec prn only for migraines and headache.      Vital Signs Last 24 Hrs  T(C): 36.7 (10-17-23 @ 08:10), Max: 36.9 (10-17-23 @ 05:17)  T(F): 98 (10-17-23 @ 08:10), Max: 98.4 (10-17-23 @ 05:17)  HR: 63 (10-17-23 @ 08:10) (63 - 63)  BP: 107/67 (10-17-23 @ 08:10) (97/57 - 107/67)  BP(mean): --  RR: 17 (10-17-23 @ 08:10) (17 - 18)  SpO2: 97% (10-17-23 @ 08:10) (96% - 97%)  AVSS    Objective:    Physical Exam:  General: Pt Alert and oriented, NAD  Right hip aquacel DSG C/D/I- adduction pillow in place  Pulses: +2 pedal pulses, wwp toes  Sensation: silt intact bilateral lower extremities  Motor: EHL/FHL/TA/GS- 5/5 bilateral lower extremities        Plan of Care:  A/P: 48yFemale POD#1 s/p R VERONICA  - afebrile  - Pain Control- celebrex 200mg PO BID, Dilaudid 0.5mg Q3h prn breakthrough pain, Oxycodone 5-10mg PO Q4h prn moderate to severe pain   - DVT ppx: aspirin 81mg PO BID  - PT, WBS: WBAT posterior hip precautions  - appreciate medicine recs  -bowel regimen, IS use, PPI   - normal saline bolus for blood pressure  systolic  - buring on urination - UA  - Dispo- home pending pt clearance      Ortho Pager [phone] Ortho Note    Subjective:  Patient seen and examined today, patient reporting surgical site pain, pain controlled with current pain medication regimen,   Denies CP, SOB, N/V, numbness/tingling   Reviewed plan of care with patient at bedside  Patient requesting to hold respiradone 1mg PO - she states she takes it at bedtime as a sleep aid and does not want it in the hospital.  Patient reports taking nurtec prn only for migraines and headache.      Vital Signs Last 24 Hrs  T(C): 36.7 (10-17-23 @ 08:10), Max: 36.9 (10-17-23 @ 05:17)  T(F): 98 (10-17-23 @ 08:10), Max: 98.4 (10-17-23 @ 05:17)  HR: 63 (10-17-23 @ 08:10) (63 - 63)  BP: 107/67 (10-17-23 @ 08:10) (97/57 - 107/67)  BP(mean): --  RR: 17 (10-17-23 @ 08:10) (17 - 18)  SpO2: 97% (10-17-23 @ 08:10) (96% - 97%)  AVSS    Objective:    Physical Exam:  General: Pt Alert and oriented, NAD  Right hip aquacel DSG C/D/I- adduction pillow in place  Pulses: +2 pedal pulses, wwp toes  Sensation: silt intact bilateral lower extremities  Motor: EHL/FHL/TA/GS- 5/5 bilateral lower extremities        Plan of Care:  A/P: 48yFemale POD#1 s/p R VERONICA  - afebrile  - Pain Control- celebrex 200mg PO BID, Dilaudid 0.5mg Q3h prn breakthrough pain, Oxycodone 5-10mg PO Q4h prn moderate to severe pain   - DVT ppx: aspirin 81mg PO BID  - PT, WBS: WBAT posterior hip precautions  - appreciate medicine recs  -bowel regimen, IS use, PPI   - normal saline bolus for blood pressure  systolic  - burning on urination - UA  - Dispo- home pending pt clearance      Ortho Pager [phone]

## 2023-10-17 NOTE — PROGRESS NOTE ADULT - SUBJECTIVE AND OBJECTIVE BOX
Ortho Note    Pt comfortable without complaints, pain controlled  Denies CP, SOB, N/V, new numbness/tingling     Vital Signs Last 24 Hrs  T(C): 36.9 (10-17-23 @ 05:17), Max: 36.9 (10-17-23 @ 05:17)  T(F): 98.4 (10-17-23 @ 05:17), Max: 98.4 (10-17-23 @ 05:17)  HR: 63 (10-17-23 @ 05:17) (63 - 63)  BP: 97/57 (10-17-23 @ 05:17) (97/57 - 97/57)  BP(mean): --  RR: 18 (10-17-23 @ 05:17) (18 - 18)  SpO2: 96% (10-17-23 @ 05:17) (96% - 96%)  I&O's Summary    16 Oct 2023 07:01  -  17 Oct 2023 07:00  --------------------------------------------------------  IN: 1150 mL / OUT: 800 mL / NET: 350 mL        General: Pt Alert and oriented, NAD  B/L LE: sensation intact, DP 2+, brisk cap refill, EHL/FHL/TA/GS 5/5  aqucell c/d/i      A/P: 48yFemale s/p R natalie 10/16  - Stable  - Pain Control  - DVT ppx: asa 81 bid  - Post op abx: ancef  - PT, WBS: wbat posterior hip precautions  Dispo: pt final eval, current dispo home PT      Ortho Pager [phone]

## 2023-10-17 NOTE — OCCUPATIONAL THERAPY INITIAL EVALUATION ADULT - DIAGNOSIS, OT EVAL
Pt p/w impaired strength, balance, and functional activity tolerance, impacting ability to perform functional mobility/ADLs.

## 2023-10-17 NOTE — OCCUPATIONAL THERAPY INITIAL EVALUATION ADULT - GENERAL OBSERVATIONS, REHAB EVAL
OT IE completed. Pt admitted to [facility] for R VERONICA. Orders received, chart reviewed, pt cleared for OT by PEDRO Castellanos. Pt received semi supine in bed, NAD, +heplock, +R hip dressing C/D/I. Pt A&Ox4, agreeable to OT, and tolerated session well.

## 2023-10-17 NOTE — DISCHARGE NOTE NURSING/CASE MANAGEMENT/SOCIAL WORK - PATIENT PORTAL LINK FT
You can access the FollowMyHealth Patient Portal offered by [facility] by registering at the following website: [email or web address]. By joining [employer]’s FollowMyHealth portal, you will also be able to view your health information using other applications (apps) compatible with our system.

## 2023-10-17 NOTE — OCCUPATIONAL THERAPY INITIAL EVALUATION ADULT - IMPAIRMENTS CONTRIBUTING IMPAIRED BED MOBILITY, REHAB EVAL
Duration Of Freeze Thaw-Cycle (Seconds): 5 Show Aperture Variable?: Yes Render Note In Bullet Format When Appropriate: No Detail Level: Detailed Consent: The patient's consent was obtained including but not limited to risks of crusting, scabbing, blistering, scarring, darker or lighter pigmentary change, recurrence, incomplete removal and infection. Number Of Freeze-Thaw Cycles: 2 freeze-thaw cycles Post-Care Instructions: I reviewed with the patient in detail post-care instructions. Patient is to wear sunprotection, and avoid picking at any of the treated lesions. Pt may apply aquaphor to crusted or scabbing areas. impaired balance/decreased flexibility/impaired postural control/decreased strength

## 2023-10-17 NOTE — DISCHARGE NOTE NURSING/CASE MANAGEMENT/SOCIAL WORK - NURSING SECTION COMPLETE
"Chief Complaint   Patient presents with     Well Child       Initial BP (!) 86/57 (BP Location: Right arm, Patient Position: Chair, Cuff Size: Child)  Pulse 103  Temp 98.2  F (36.8  C) (Temporal)  Ht 3' 3.5\" (1.003 m)  Wt 33 lb 9.6 oz (15.2 kg)  SpO2 97%  BMI 15.14 kg/m2 Estimated body mass index is 15.14 kg/(m^2) as calculated from the following:    Height as of this encounter: 3' 3.5\" (1.003 m).    Weight as of this encounter: 33 lb 9.6 oz (15.2 kg).  Medication Reconciliation: complete     Rubia Rico MA      " Patient/Caregiver provided printed discharge information.

## 2023-10-17 NOTE — CONSULT NOTE ADULT - SUBJECTIVE AND OBJECTIVE BOX
HPI: 48 YOF with PMH of hairy cell leukemia s/p chemo in remission, NAFLD, anxiety/depression, asthma, CBP, GERD, migraines admitted for elective R VERONICA s/p OR with Dr. Jason on 10/16. EMMA. Reports pain is okay with her current pain medications. On ROS, she did endorse dysuria. Denies fever, vision changes, hearing changes, rhinorrhea, sore throat, chest pain, palpitations, cough, SOB, abd pain, N/V/D, hematuria, rash, numbness/weakness/tingling, HA, LOC.    ROS: negative except as per HPI    PMH: as per HPI  PSH: as per HPI  Medications: reviewed  Allergies: reviewed  SH:  FH:    Diet, Regular (10-16-23 @ 17:40) [Active]  Diet, Clear Liquid (10-16-23 @ 10:45) [Available for Activation]      MEDICATIONS:  MEDICATIONS  (STANDING):  albuterol    90 MICROgram(s) HFA Inhaler 1 Puff(s) Inhalation daily  aspirin enteric coated 81 milliGRAM(s) Oral two times a day  FLUoxetine 10 milliGRAM(s) Oral daily  lactated ringers. 1000 milliLiter(s) (150 mL/Hr) IV Continuous <Continuous>  multivitamin 1 Tablet(s) Oral daily  pantoprazole    Tablet 40 milliGRAM(s) Oral before breakfast  polyethylene glycol 3350 17 Gram(s) Oral at bedtime  senna 2 Tablet(s) Oral at bedtime    MEDICATIONS  (PRN):  HYDROmorphone  Injectable 0.5 milliGRAM(s) IV Push every 3 hours PRN Breakthrough pain  HYDROmorphone  Injectable 0.5 milliGRAM(s) IV Push every 15 minutes PRN breakthrough pacu only  ondansetron Injectable 4 milliGRAM(s) IV Push every 4 hours PRN Nausea and/or Vomiting  oxyCODONE    IR 5 milliGRAM(s) Oral every 4 hours PRN Moderate Pain (4 - 6)  oxyCODONE    IR 10 milliGRAM(s) Oral every 4 hours PRN Severe Pain (7 - 10)      Allergies    Levaquin (Other; Rash)  magnesium sulfate (Headache; Nausea)    Intolerances        OBJECTIVE:  Vital Signs Last 24 Hrs  T(C): 36.7 (17 Oct 2023 08:10), Max: 36.9 (17 Oct 2023 05:17)  T(F): 98 (17 Oct 2023 08:10), Max: 98.4 (17 Oct 2023 05:17)  HR: 63 (17 Oct 2023 08:10) (50 - 71)  BP: 112/71 (17 Oct 2023 12:02) (91/54 - 112/71)  BP(mean): 68 (16 Oct 2023 16:55) (68 - 74)  RR: 17 (17 Oct 2023 08:10) (12 - 18)  SpO2: 97% (17 Oct 2023 08:10) (96% - 99%)    Parameters below as of 17 Oct 2023 08:10  Patient On (Oxygen Delivery Method): room air      I&O's Summary    16 Oct 2023 07:01  -  17 Oct 2023 07:00  --------------------------------------------------------  IN: 1150 mL / OUT: 800 mL / NET: 350 mL        PHYSICAL EXAM:  Gen: appears stated age, resting comfortably, NAD  HEENT: NCAT, MMM, clear OP  Neck: supple  CV: RRR, no m/r/g, peripheral pulses 2+  Pulm: CTAB, no increased work of breathing, no rales/rhonchi  Abd: soft, ND, NT, periumbilical hernia soft and reducible without tenderness  Skin: warm and dry  Ext: R hip dressing CDI with some edema  Neuro: AOx3, speaking in full sentences  Psych: affect and behavior appropriate, pleasant at time of interview    LABS:                        10.5   12.15 )-----------( 194      ( 17 Oct 2023 05:30 )             32.3     10-17    135  |  106  |  12  ----------------------------<  150<H>  4.2   |  25  |  0.58    Ca    9.5      17 Oct 2023 05:30          CAPILLARY BLOOD GLUCOSE        Urinalysis Basic - ( 17 Oct 2023 05:30 )    Color: x / Appearance: x / SG: x / pH: x  Gluc: 150 mg/dL / Ketone: x  / Bili: x / Urobili: x   Blood: x / Protein: x / Nitrite: x   Leuk Esterase: x / RBC: x / WBC x   Sq Epi: x / Non Sq Epi: x / Bacteria: x        MICRODATA:      RADIOLOGY/OTHER STUDIES:

## 2023-10-17 NOTE — OCCUPATIONAL THERAPY INITIAL EVALUATION ADULT - MODALITIES TREATMENT COMMENTS
Pt able to ambulate in room/hallway with CGAx1 using RW, demo slow and cautious gait as well as decreased step length and impaired ability to weight shift onto RLE.

## 2023-10-18 ENCOUNTER — TRANSCRIPTION ENCOUNTER (OUTPATIENT)
Age: 48
End: 2023-10-18

## 2023-10-18 LAB
ANION GAP SERPL CALC-SCNC: 7 MMOL/L — SIGNIFICANT CHANGE UP (ref 5–17)
ANION GAP SERPL CALC-SCNC: 7 MMOL/L — SIGNIFICANT CHANGE UP (ref 5–17)
BUN SERPL-MCNC: 9 MG/DL — SIGNIFICANT CHANGE UP (ref 7–23)
BUN SERPL-MCNC: 9 MG/DL — SIGNIFICANT CHANGE UP (ref 7–23)
CALCIUM SERPL-MCNC: 10 MG/DL — SIGNIFICANT CHANGE UP (ref 8.4–10.5)
CALCIUM SERPL-MCNC: 10 MG/DL — SIGNIFICANT CHANGE UP (ref 8.4–10.5)
CHLORIDE SERPL-SCNC: 106 MMOL/L — SIGNIFICANT CHANGE UP (ref 96–108)
CHLORIDE SERPL-SCNC: 106 MMOL/L — SIGNIFICANT CHANGE UP (ref 96–108)
CO2 SERPL-SCNC: 28 MMOL/L — SIGNIFICANT CHANGE UP (ref 22–31)
CO2 SERPL-SCNC: 28 MMOL/L — SIGNIFICANT CHANGE UP (ref 22–31)
CREAT SERPL-MCNC: 0.62 MG/DL — SIGNIFICANT CHANGE UP (ref 0.5–1.3)
CREAT SERPL-MCNC: 0.62 MG/DL — SIGNIFICANT CHANGE UP (ref 0.5–1.3)
EGFR: 108 ML/MIN/1.73M2 — SIGNIFICANT CHANGE UP
EGFR: 108 ML/MIN/1.73M2 — SIGNIFICANT CHANGE UP
GLUCOSE SERPL-MCNC: 129 MG/DL — HIGH (ref 70–99)
GLUCOSE SERPL-MCNC: 129 MG/DL — HIGH (ref 70–99)
HCT VFR BLD CALC: 32.2 % — LOW (ref 34.5–45)
HCT VFR BLD CALC: 32.2 % — LOW (ref 34.5–45)
HGB BLD-MCNC: 10.2 G/DL — LOW (ref 11.5–15.5)
HGB BLD-MCNC: 10.2 G/DL — LOW (ref 11.5–15.5)
MCHC RBC-ENTMCNC: 28.9 PG — SIGNIFICANT CHANGE UP (ref 27–34)
MCHC RBC-ENTMCNC: 28.9 PG — SIGNIFICANT CHANGE UP (ref 27–34)
MCHC RBC-ENTMCNC: 31.7 GM/DL — LOW (ref 32–36)
MCHC RBC-ENTMCNC: 31.7 GM/DL — LOW (ref 32–36)
MCV RBC AUTO: 91.2 FL — SIGNIFICANT CHANGE UP (ref 80–100)
MCV RBC AUTO: 91.2 FL — SIGNIFICANT CHANGE UP (ref 80–100)
NRBC # BLD: 0 /100 WBCS — SIGNIFICANT CHANGE UP (ref 0–0)
NRBC # BLD: 0 /100 WBCS — SIGNIFICANT CHANGE UP (ref 0–0)
PLATELET # BLD AUTO: 173 K/UL — SIGNIFICANT CHANGE UP (ref 150–400)
PLATELET # BLD AUTO: 173 K/UL — SIGNIFICANT CHANGE UP (ref 150–400)
POTASSIUM SERPL-MCNC: 4 MMOL/L — SIGNIFICANT CHANGE UP (ref 3.5–5.3)
POTASSIUM SERPL-MCNC: 4 MMOL/L — SIGNIFICANT CHANGE UP (ref 3.5–5.3)
POTASSIUM SERPL-SCNC: 4 MMOL/L — SIGNIFICANT CHANGE UP (ref 3.5–5.3)
POTASSIUM SERPL-SCNC: 4 MMOL/L — SIGNIFICANT CHANGE UP (ref 3.5–5.3)
RBC # BLD: 3.53 M/UL — LOW (ref 3.8–5.2)
RBC # BLD: 3.53 M/UL — LOW (ref 3.8–5.2)
RBC # FLD: 13.3 % — SIGNIFICANT CHANGE UP (ref 10.3–14.5)
RBC # FLD: 13.3 % — SIGNIFICANT CHANGE UP (ref 10.3–14.5)
SODIUM SERPL-SCNC: 141 MMOL/L — SIGNIFICANT CHANGE UP (ref 135–145)
SODIUM SERPL-SCNC: 141 MMOL/L — SIGNIFICANT CHANGE UP (ref 135–145)
WBC # BLD: 7.09 K/UL — SIGNIFICANT CHANGE UP (ref 3.8–10.5)
WBC # BLD: 7.09 K/UL — SIGNIFICANT CHANGE UP (ref 3.8–10.5)
WBC # FLD AUTO: 7.09 K/UL — SIGNIFICANT CHANGE UP (ref 3.8–10.5)
WBC # FLD AUTO: 7.09 K/UL — SIGNIFICANT CHANGE UP (ref 3.8–10.5)

## 2023-10-18 PROCEDURE — 99232 SBSQ HOSP IP/OBS MODERATE 35: CPT

## 2023-10-18 RX ORDER — MELOXICAM 15 MG/1
1 TABLET ORAL
Refills: 0 | DISCHARGE

## 2023-10-18 RX ORDER — FLUOXETINE HCL 10 MG
1 CAPSULE ORAL
Qty: 0 | Refills: 0 | DISCHARGE
Start: 2023-10-18

## 2023-10-18 RX ORDER — OMEPRAZOLE 10 MG/1
1 CAPSULE, DELAYED RELEASE ORAL
Refills: 0 | DISCHARGE

## 2023-10-18 RX ORDER — CELECOXIB 200 MG/1
1 CAPSULE ORAL
Qty: 0 | Refills: 0 | DISCHARGE
Start: 2023-10-18

## 2023-10-18 RX ORDER — OXYCODONE HYDROCHLORIDE 5 MG/1
1 TABLET ORAL
Qty: 0 | Refills: 0 | DISCHARGE
Start: 2023-10-18

## 2023-10-18 RX ORDER — ASPIRIN/CALCIUM CARB/MAGNESIUM 324 MG
1 TABLET ORAL
Qty: 0 | Refills: 0 | DISCHARGE
Start: 2023-10-18

## 2023-10-18 RX ORDER — ALBUTEROL 90 UG/1
2 AEROSOL, METERED ORAL
Qty: 0 | Refills: 0 | DISCHARGE

## 2023-10-18 RX ORDER — POLYETHYLENE GLYCOL 3350 17 G/17G
17 POWDER, FOR SOLUTION ORAL
Qty: 0 | Refills: 0 | DISCHARGE
Start: 2023-10-18

## 2023-10-18 RX ORDER — PANTOPRAZOLE SODIUM 20 MG/1
1 TABLET, DELAYED RELEASE ORAL
Qty: 0 | Refills: 0 | DISCHARGE
Start: 2023-10-18

## 2023-10-18 RX ORDER — FLUOXETINE HCL 10 MG
0 CAPSULE ORAL
Refills: 0 | DISCHARGE

## 2023-10-18 RX ADMIN — SENNA PLUS 2 TABLET(S): 8.6 TABLET ORAL at 21:27

## 2023-10-18 RX ADMIN — POLYETHYLENE GLYCOL 3350 17 GRAM(S): 17 POWDER, FOR SOLUTION ORAL at 22:19

## 2023-10-18 RX ADMIN — OXYCODONE HYDROCHLORIDE 5 MILLIGRAM(S): 5 TABLET ORAL at 22:27

## 2023-10-18 RX ADMIN — Medication 1 TABLET(S): at 12:04

## 2023-10-18 RX ADMIN — OXYCODONE HYDROCHLORIDE 5 MILLIGRAM(S): 5 TABLET ORAL at 21:27

## 2023-10-18 RX ADMIN — CELECOXIB 200 MILLIGRAM(S): 200 CAPSULE ORAL at 19:47

## 2023-10-18 RX ADMIN — Medication 81 MILLIGRAM(S): at 05:45

## 2023-10-18 RX ADMIN — CELECOXIB 200 MILLIGRAM(S): 200 CAPSULE ORAL at 05:57

## 2023-10-18 RX ADMIN — Medication 81 MILLIGRAM(S): at 18:16

## 2023-10-18 RX ADMIN — CELECOXIB 200 MILLIGRAM(S): 200 CAPSULE ORAL at 05:45

## 2023-10-18 RX ADMIN — PANTOPRAZOLE SODIUM 40 MILLIGRAM(S): 20 TABLET, DELAYED RELEASE ORAL at 05:45

## 2023-10-18 RX ADMIN — CELECOXIB 200 MILLIGRAM(S): 200 CAPSULE ORAL at 18:16

## 2023-10-18 RX ADMIN — ALBUTEROL 1 PUFF(S): 90 AEROSOL, METERED ORAL at 12:11

## 2023-10-18 RX ADMIN — Medication 10 MILLIGRAM(S): at 12:04

## 2023-10-18 NOTE — DISCHARGE NOTE PROVIDER - HOSPITAL COURSE
Admit to Orthopaedics 10/16/23 for right total hip replacement   Perioperative Antibiotics  DVT prophylaxis  Physical Therapy  Pain Management   Medical Co Management Admit to Orthopaedics 10/16/23 for right total hip replacement   Attending: Dr. Jason   Perioperative Antibiotics  DVT prophylaxis: Aspirin 81mg twice daily   Physical Therapy  Pain Management   Medical Co Management Admit to Orthopaedics 10/16/23 for right total hip replacement   Attending: Dr. Jason   Perioperative Antibiotics  DVT prophylaxis: Aspirin 81mg twice daily   Physical Therapy  Pain Management   Medical Co Management     Patient requires a commode for home use due to functional mobility limitations following total hip replacement surgery. Admit to Orthopaedics 10/16/23 for right total hip replacement   Attending: Dr. Jason   Perioperative Antibiotics  DVT prophylaxis: Aspirin 81mg twice daily   Physical Therapy  Pain Management   Medical Co Management   Duplex negative for DVT bilaterally 10/19/23    Patient requires a commode for home use due to functional mobility limitations following total hip replacement surgery.

## 2023-10-18 NOTE — DISCHARGE NOTE PROVIDER - NSDCDCMDCOMP_GEN_ALL_CORE
This document is complete and the patient is ready for discharge. Quality 226: Preventive Care And Screening: Tobacco Use: Screening And Cessation Intervention: Patient screened for tobacco use and is an ex/non-smoker Quality 431: Preventive Care And Screening: Unhealthy Alcohol Use - Screening: Patient not identified as an unhealthy alcohol user when screened for unhealthy alcohol use using a systematic screening method Detail Level: Zone

## 2023-10-18 NOTE — DISCHARGE NOTE PROVIDER - CARE PROVIDER_API CALL
Kian Jason  Orthopaedic Surgery  [address]  [place] [address]  Phone: [phone]  Fax: [phone]  Follow Up Time:    Kian Jason  Orthopaedic Surgery  [address]  [place] [address]  Phone: [phone]  Fax: [phone]  Follow Up Time: 2 weeks

## 2023-10-18 NOTE — DISCHARGE NOTE PROVIDER - NSDCFUSCHEDAPPT_GEN_ALL_CORE_FT
Kian Jason  [facility] Physician [facility]  ORTHOSURG [address]  Scheduled Appointment: 10/31/2023

## 2023-10-18 NOTE — DISCHARGE NOTE PROVIDER - NSDCMRMEDTOKEN_GEN_ALL_CORE_FT
fluoxetine:   meloxicam 15 mg oral tablet: 1 tab(s) orally once a day  Nurtec ODT 75 mg oral tablet, disintegratin tab(s) orally  omeprazole 40 mg oral delayed release capsule: 1 cap(s) orally  ProAir HFA 90 mcg/inh inhalation aerosol: 2 puff(s) inhaled  risperiDONE 1 mg oral tablet: 1 tab(s) orally  Wixela Inhub 500 mcg-50 mcg inhalation powder: 1 inhaled   aspirin 81 mg oral delayed release tablet: 1 tab(s) orally 2 times a day  celecoxib 200 mg oral capsule: 1 cap(s) orally every 12 hours  FLUoxetine 10 mg oral capsule: 1 cap(s) orally once a day  Multiple Vitamins oral tablet: 1 tab(s) orally once a day  Nurtec ODT 75 mg oral tablet, disintegratin tab(s) orally  oxyCODONE 10 mg oral tablet: 1 tab(s) orally every 4 hours As needed Severe Pain (7 - 10)  oxyCODONE 5 mg oral tablet: 1 tab(s) orally every 4 hours As needed Moderate Pain (4 - 6)  pantoprazole 40 mg oral delayed release tablet: 1 tab(s) orally once a day (before a meal)  polyethylene glycol 3350 oral powder for reconstitution: 17 gram(s) orally once a day (at bedtime)  ProAir HFA 90 mcg/inh inhalation aerosol: 2 puff(s) inhaled once a day as needed for  bronchospasm  risperiDONE 1 mg oral tablet: 1 tab(s) orally  Wixela Inhub 500 mcg-50 mcg inhalation powder: 1 inhaled

## 2023-10-18 NOTE — DISCHARGE NOTE PROVIDER - NSDCFUADDINST_GEN_ALL_CORE_FT
Please follow Dr. Jason's instruction sheets   ACTIVITY:   - Weight bear as tolerated with assistive device. No strenuous activity, heavy lifting, driving or returning to work until cleared by MD.   - Apply a cold compress to the surgical site several times daily to reduce pain and swelling. For icing, twenty-minute sessions followed by an hour off is recommended. You should ice as frequently as possible. Ice should NEVER be placed directly on the skin. Wearing compression stockings during the first week after surgery can help reduce swelling in your knee, calf and foot, but is not required.      Hip precautions:   The following precautions will remain in effect for 6 weeks following surgery:   · Do not cross your knees.   · Do not flex your hip (i.e., bring your knee toward your chest) beyond 90 degrees.   · Use a raised toilet seat. Do not use low chairs or couches.   · Sleep with a pillow between your knees.   · Do not reach down to  items on the floor.       DRESSING/SHOWERING:   - You may shower, your dressing is water-resistant. Do not soak in bathtubs. Remove dressing after postop day 7, then leave incision open to air. You may do this yourself (simply peel it off), or you may ask for assistance from your visiting nurse. Keep your incision clean and dry. Do not pick at your incision. Do not apply creams, ointments or oils to your incision until cleared by your surgeon. Do not soak your incision in sitting water (ie tubs, pools, lakes, etc.) until cleared by your surgeon. Do not scrub the incision – instead, allow soap and water to flow over the incision and then pat it dry with a clean towel.   Do not remove the dressing/tape overlying your incision.     MEDICATION/ANTICOAGULATION:   -You have been prescribed Aspirin 81 twice daily, as a preventative to help prevent postoperative blood clots. Please take this medication as prescribed.    - You have been prescribed medications for pain:     - Tylenol for mild to moderate pain. Do not exceed 3,000mg daily.     - For more severe pain, take Tylenol with the addition of narcotic pain medication. Take this medication as prescribed. This medication may cause drowsiness or dizziness. Do not operate machinery. This medication may cause constipation.   - For any additional medications, follow instructions on the bottle.    -Try to have regular bowel movements. Take stool softener or laxative if necessary. You may wish to take Miralax daily until you have regular bowel movements.    - If you have been prescribed Aspirin or an anti-inflammatory, please take prilosec (omeprazole) once a day, before breakfast, until no longer taking Aspirin or anti-inflammatory. This will help protect your stomach.   - If you have a pain management physician, please follow-up with them postoperatively.    - If you experience any negative side effects of your medications, please call your surgeon's office to discuss.      FOLLOW-UP:   - Call to schedule an appt with Dr. Jason for follow up.   - Please follow-up with your primary care physician or any other specialist you see postoperatively, if needed.    - Contact your doctor or go to the emergency room if you experience: fever greater than 101.5, chills, chest pain, difficulty breathing, redness or excessive drainage around the incision, other concerns.

## 2023-10-18 NOTE — PROGRESS NOTE ADULT - SUBJECTIVE AND OBJECTIVE BOX
SUBJECTIVE: NAEON. Patient states she was able to sleep okay last night. Pain is present in hip but tolerable. She states she worked with PT twice yesterday and already once this morning - she feels she is doing well but feels exhauseted/tired. Dysuria resolved. No other acute complaints. Concerned about discharge timing and ride - sister to pick her up but patient wondering if could have ambulette arranged.     MEDICATIONS:  MEDICATIONS  (STANDING):  albuterol    90 MICROgram(s) HFA Inhaler 1 Puff(s) Inhalation daily  aspirin enteric coated 81 milliGRAM(s) Oral two times a day  celecoxib 200 milliGRAM(s) Oral every 12 hours  FLUoxetine 10 milliGRAM(s) Oral daily  lactated ringers. 1000 milliLiter(s) (150 mL/Hr) IV Continuous <Continuous>  multivitamin 1 Tablet(s) Oral daily  pantoprazole    Tablet 40 milliGRAM(s) Oral before breakfast  polyethylene glycol 3350 17 Gram(s) Oral at bedtime  senna 2 Tablet(s) Oral at bedtime    MEDICATIONS  (PRN):  HYDROmorphone  Injectable 0.5 milliGRAM(s) IV Push every 3 hours PRN Breakthrough pain  HYDROmorphone  Injectable 0.5 milliGRAM(s) IV Push every 15 minutes PRN breakthrough pacu only  ondansetron Injectable 4 milliGRAM(s) IV Push every 4 hours PRN Nausea and/or Vomiting  oxyCODONE    IR 5 milliGRAM(s) Oral every 4 hours PRN Moderate Pain (4 - 6)  oxyCODONE    IR 10 milliGRAM(s) Oral every 4 hours PRN Severe Pain (7 - 10)      Allergies    Levaquin (Other; Rash)  magnesium sulfate (Headache; Nausea)    Intolerances        OBJECTIVE:  Vital Signs Last 24 Hrs  T(C): 36.6 (18 Oct 2023 09:10), Max: 37 (18 Oct 2023 05:50)  T(F): 97.9 (18 Oct 2023 09:10), Max: 98.6 (18 Oct 2023 05:50)  HR: 74 (18 Oct 2023 09:10) (74 - 88)  BP: 99/61 (18 Oct 2023 09:10) (99/61 - 111/69)  BP(mean): --  RR: 16 (18 Oct 2023 09:10) (16 - 17)  SpO2: 97% (18 Oct 2023 09:10) (97% - 98%)    Parameters below as of 18 Oct 2023 09:10  Patient On (Oxygen Delivery Method): room air      I&O's Summary    18 Oct 2023 07:01  -  18 Oct 2023 13:47  --------------------------------------------------------  IN: 360 mL / OUT: 0 mL / NET: 360 mL    PHYSICAL EXAM:  Gen: appears stated age, resting comfortably, NAD  HEENT: NCAT, MMM, clear OP  Neck: supple  CV: RRR, no m/r/g, peripheral pulses 2+  Pulm: CTAB, no increased work of breathing, no rales/rhonchi  Abd: soft, ND, NT, periumbilical hernia soft and reducible without tenderness  Skin: warm and dry  Ext: R hip dressing CDI with some edema  Neuro: AOx3, speaking in full sentences  Psych: affect and behavior appropriate, pleasant at time of interview    LABS:                        10.2   7.09  )-----------( 173      ( 18 Oct 2023 05:30 )             32.2     10-18    141  |  106  |  9   ----------------------------<  129<H>  4.0   |  28  |  0.62    Ca    10.0      18 Oct 2023 05:30          CAPILLARY BLOOD GLUCOSE        Urinalysis Basic - ( 18 Oct 2023 05:30 )    Color: x / Appearance: x / SG: x / pH: x  Gluc: 129 mg/dL / Ketone: x  / Bili: x / Urobili: x   Blood: x / Protein: x / Nitrite: x   Leuk Esterase: x / RBC: x / WBC x   Sq Epi: x / Non Sq Epi: x / Bacteria: x        MICRODATA:      RADIOLOGY/OTHER STUDIES:

## 2023-10-18 NOTE — PROGRESS NOTE ADULT - SUBJECTIVE AND OBJECTIVE BOX
Ortho Note    Pt comfortable without complaints, pain controlled  Denies CP, SOB, N/V, new numbness/tingling     Vital Signs Last 24 Hrs  T(C): 37 (10-18-23 @ 05:50), Max: 37 (10-18-23 @ 05:50)  T(F): 98.6 (10-18-23 @ 05:50), Max: 98.6 (10-18-23 @ 05:50)  HR: 88 (10-18-23 @ 05:50) (88 - 88)  BP: 111/69 (10-18-23 @ 05:50) (111/69 - 111/69)  BP(mean): --  RR: 17 (10-18-23 @ 05:50) (17 - 17)  SpO2: 97% (10-18-23 @ 05:50) (97% - 97%)  I&O's Summary    16 Oct 2023 07:01  -  17 Oct 2023 07:00  --------------------------------------------------------  IN: 1150 mL / OUT: 800 mL / NET: 350 mL        General: Pt Alert and oriented, NAD  B/L LE: sensation intact, DP 2+, brisk cap refill, EHL/FHL/TA/GS 5/5  aqucell c/d/i                          10.5   12.15 )-----------( 194      ( 17 Oct 2023 05:30 )             32.3     10-17    135  |  106  |  12  ----------------------------<  150<H>  4.2   |  25  |  0.58    Ca    9.5      17 Oct 2023 05:30      A/P: 48yFemale s/p R natalie 10/16  - Stable  - Pain Control  - DVT ppx: asa 81 bid  - Post op abx: ancef  - PT, WBS: wbat posterior hip precautions  Dispo: pt/ot clearance, current dispo home PT    Ortho Pager [phone]

## 2023-10-19 VITALS
RESPIRATION RATE: 17 BRPM | HEART RATE: 85 BPM | TEMPERATURE: 98 F | SYSTOLIC BLOOD PRESSURE: 111 MMHG | DIASTOLIC BLOOD PRESSURE: 62 MMHG | OXYGEN SATURATION: 98 %

## 2023-10-19 PROCEDURE — S2900: CPT

## 2023-10-19 PROCEDURE — 93970 EXTREMITY STUDY: CPT

## 2023-10-19 PROCEDURE — 80048 BASIC METABOLIC PNL TOTAL CA: CPT

## 2023-10-19 PROCEDURE — 36415 COLL VENOUS BLD VENIPUNCTURE: CPT

## 2023-10-19 PROCEDURE — 86901 BLOOD TYPING SEROLOGIC RH(D): CPT

## 2023-10-19 PROCEDURE — C1713: CPT

## 2023-10-19 PROCEDURE — 81003 URINALYSIS AUTO W/O SCOPE: CPT

## 2023-10-19 PROCEDURE — C1776: CPT

## 2023-10-19 PROCEDURE — 97162 PT EVAL MOD COMPLEX 30 MIN: CPT

## 2023-10-19 PROCEDURE — 94640 AIRWAY INHALATION TREATMENT: CPT

## 2023-10-19 PROCEDURE — 97165 OT EVAL LOW COMPLEX 30 MIN: CPT

## 2023-10-19 PROCEDURE — 86850 RBC ANTIBODY SCREEN: CPT

## 2023-10-19 PROCEDURE — 86900 BLOOD TYPING SEROLOGIC ABO: CPT

## 2023-10-19 PROCEDURE — 72170 X-RAY EXAM OF PELVIS: CPT

## 2023-10-19 PROCEDURE — 97116 GAIT TRAINING THERAPY: CPT

## 2023-10-19 PROCEDURE — 99232 SBSQ HOSP IP/OBS MODERATE 35: CPT

## 2023-10-19 PROCEDURE — 85027 COMPLETE CBC AUTOMATED: CPT

## 2023-10-19 PROCEDURE — 93970 EXTREMITY STUDY: CPT | Mod: 26

## 2023-10-19 PROCEDURE — C1889: CPT

## 2023-10-19 RX ADMIN — PANTOPRAZOLE SODIUM 40 MILLIGRAM(S): 20 TABLET, DELAYED RELEASE ORAL at 05:22

## 2023-10-19 RX ADMIN — OXYCODONE HYDROCHLORIDE 5 MILLIGRAM(S): 5 TABLET ORAL at 06:22

## 2023-10-19 RX ADMIN — Medication 81 MILLIGRAM(S): at 05:22

## 2023-10-19 RX ADMIN — OXYCODONE HYDROCHLORIDE 5 MILLIGRAM(S): 5 TABLET ORAL at 05:22

## 2023-10-19 RX ADMIN — CELECOXIB 200 MILLIGRAM(S): 200 CAPSULE ORAL at 06:30

## 2023-10-19 RX ADMIN — CELECOXIB 200 MILLIGRAM(S): 200 CAPSULE ORAL at 05:22

## 2023-10-19 NOTE — PROGRESS NOTE ADULT - ASSESSMENT
48 YOF with PMH of hairy cell leukemia s/p chemo in remission, NAFLD, anxiety/depression, asthma, CBP, GERD, migraines admitted for elective R VERONICA s/p OR with Dr. Jason on 10/16.     Right hip pain s/p VERONICA  - management per ortho, s/p OR with Dr. Jason on 10/16  - pain control with bowel regimen, frequent perioperative incentive spirometer use  - consider chemical DVT ppx with LMWH or DOAC (on asa 81mg BID)    Dysuria  - resolved  - UA neg for infection     Leukocytosis  - resolved, afebrile, no s/s infection  - likely reactive 2/2 surgery  - monitor off antibiotics    Acute blood loss anemia  - Hgb 14.2 --> 10 HDS  - asymptomatic w/o active s/s bleeding  - likely 2/2 operative losses    Hairy cell leukemia  - s/p chemo in remission    Anxiety/depression  - cont home fluoxetine 10mg, risperidone PRN    Asthma  - home: Wixela, ProAir  - inpatient: symbicort, albuterol     GERD  - home omeprazole --> pantoprazole 40mg    Migraines  - nurtec 75mg PRN     Dipso: home with HPT     Plan discussed with primary team. 
48 YOF with PMH of hairy cell leukemia s/p chemo in remission, NAFLD, anxiety/depression, asthma, CBP, GERD, migraines admitted for elective R VERONICA s/p OR with Dr. Jason on 10/16.     Right hip pain s/p VERONICA  - management per ortho, s/p OR with Dr. Jason on 10/16  - pain control with bowel regimen, frequent perioperative incentive spirometer use  - consider chemical DVT ppx with LMWH or DOAC (on asa 81mg BID)    Dysuria  - resolved  - UA neg for infection     Leukocytosis  - resolved, afebrile, no s/s infection  - likely reactive 2/2 surgery  - monitor off antibiotics    Acute blood loss anemia  - Hgb 14.2 --> 10 HDS  - asymptomatic w/o active s/s bleeding  - likely 2/2 operative losses    Hairy cell leukemia  - s/p chemo in remission    Anxiety/depression  - cont home fluoxetine 10mg, risperidone PRN    Asthma  - home: Wixela, ProAir  - inpatient: symbicort, albuterol     GERD  - home omeprazole --> pantoprazole 40mg    Migraines  - nurtec 75mg PRN     Dipso: home with HPT pending clearance    Plan discussed with primary team.

## 2023-10-19 NOTE — PROGRESS NOTE ADULT - REASON FOR ADMISSION
Right hip pain

## 2023-10-19 NOTE — PROGRESS NOTE ADULT - SUBJECTIVE AND OBJECTIVE BOX
Ortho Note    Pt comfortable without complaints, pain controlled. Endorses extremely minimal calf tenderness this am  Denies CP, SOB, N/V, new numbness/tingling     Vital Signs Last 24 Hrs  T(C): 36.8 (10-19-23 @ 05:00), Max: 36.8 (10-19-23 @ 05:00)  T(F): 98.2 (10-19-23 @ 05:00), Max: 98.2 (10-19-23 @ 05:00)  HR: 79 (10-19-23 @ 05:00) (79 - 79)  BP: 107/56 (10-19-23 @ 05:00) (107/56 - 107/56)  BP(mean): --  RR: 16 (10-19-23 @ 05:00) (16 - 16)  SpO2: 98% (10-19-23 @ 05:00) (98% - 98%)  I&O's Summary    18 Oct 2023 07:01  -  19 Oct 2023 06:33  --------------------------------------------------------  IN: 600 mL / OUT: 0 mL / NET: 600 mL        General: Pt Alert and oriented, NAD  B/L LE: sensation intact, DP 2+, brisk cap refill, EHL/FHL/TA/GS 5/5  aqucell c/d/i                          10.2   7.09  )-----------( 173      ( 18 Oct 2023 05:30 )             32.2     10-18    141  |  106  |  9   ----------------------------<  129<H>  4.0   |  28  |  0.62    Ca    10.0      18 Oct 2023 05:30        A/P: 48yFemale s/p R natalie 10/16  - Stable  - Pain Control  - DVT ppx: asa 81 bid  - Post op abx: ancef  - PT, WBS: wbat posterior hip precautions  Dispo: pend duplex, dcd from PT    Ortho Pager [phone]      Ortho Pager [phone]

## 2023-10-19 NOTE — PROGRESS NOTE ADULT - SUBJECTIVE AND OBJECTIVE BOX
SUBJECTIVE: NAEON. Patient feels well. Complained of mild L calf pain earlier today, venous duplex neg for DVT. Plan for discharge home today.    MEDICATIONS:  MEDICATIONS  (STANDING):  albuterol    90 MICROgram(s) HFA Inhaler 1 Puff(s) Inhalation daily  aspirin enteric coated 81 milliGRAM(s) Oral two times a day  celecoxib 200 milliGRAM(s) Oral every 12 hours  FLUoxetine 10 milliGRAM(s) Oral daily  lactated ringers. 1000 milliLiter(s) (150 mL/Hr) IV Continuous <Continuous>  multivitamin 1 Tablet(s) Oral daily  pantoprazole    Tablet 40 milliGRAM(s) Oral before breakfast  polyethylene glycol 3350 17 Gram(s) Oral at bedtime  senna 2 Tablet(s) Oral at bedtime    MEDICATIONS  (PRN):  HYDROmorphone  Injectable 0.5 milliGRAM(s) IV Push every 3 hours PRN Breakthrough pain  HYDROmorphone  Injectable 0.5 milliGRAM(s) IV Push every 15 minutes PRN breakthrough pacu only  ondansetron Injectable 4 milliGRAM(s) IV Push every 4 hours PRN Nausea and/or Vomiting  oxyCODONE    IR 5 milliGRAM(s) Oral every 4 hours PRN Moderate Pain (4 - 6)  oxyCODONE    IR 10 milliGRAM(s) Oral every 4 hours PRN Severe Pain (7 - 10)      Allergies    Levaquin (Other; Rash)  magnesium sulfate (Headache; Nausea)    Intolerances        OBJECTIVE:  Vital Signs Last 24 Hrs  T(C): 36.9 (19 Oct 2023 08:20), Max: 36.9 (18 Oct 2023 21:00)  T(F): 98.4 (19 Oct 2023 08:20), Max: 98.5 (18 Oct 2023 21:00)  HR: 85 (19 Oct 2023 08:20) (79 - 97)  BP: 111/62 (19 Oct 2023 08:20) (100/61 - 115/75)  BP(mean): --  RR: 17 (19 Oct 2023 08:20) (16 - 17)  SpO2: 98% (19 Oct 2023 08:20) (98% - 98%)    Parameters below as of 19 Oct 2023 08:20  Patient On (Oxygen Delivery Method): room air      I&O's Summary    18 Oct 2023 07:01  -  19 Oct 2023 07:00  --------------------------------------------------------  IN: 600 mL / OUT: 0 mL / NET: 600 mL        PHYSICAL EXAM:  Gen: appears stated age, resting comfortably, NAD  HEENT: NCAT, MMM, clear OP  Neck: supple  CV: RRR, no m/r/g, peripheral pulses 2+  Pulm: CTAB, no increased work of breathing, no rales/rhonchi  Abd: soft, ND, NT, periumbilical hernia soft and reducible without tenderness  Skin: warm and dry  Ext: R hip dressing CDI, FROM  Neuro: AOx3, speaking in full sentences  Psych: affect and behavior appropriate, pleasant at time of interview    LABS:                        10.2   7.09  )-----------( 173      ( 18 Oct 2023 05:30 )             32.2     10-18    141  |  106  |  9   ----------------------------<  129<H>  4.0   |  28  |  0.62    Ca    10.0      18 Oct 2023 05:30          CAPILLARY BLOOD GLUCOSE        Urinalysis Basic - ( 18 Oct 2023 05:30 )    Color: x / Appearance: x / SG: x / pH: x  Gluc: 129 mg/dL / Ketone: x  / Bili: x / Urobili: x   Blood: x / Protein: x / Nitrite: x   Leuk Esterase: x / RBC: x / WBC x   Sq Epi: x / Non Sq Epi: x / Bacteria: x        MICRODATA:      RADIOLOGY/OTHER STUDIES:    US venous duplex BLE:  IMPRESSION:  No evidence of deep venous thrombosis in either lower extremity.

## 2023-10-24 DIAGNOSIS — M16.11 UNILATERAL PRIMARY OSTEOARTHRITIS, RIGHT HIP: ICD-10-CM

## 2023-10-24 DIAGNOSIS — Z88.1 ALLERGY STATUS TO OTHER ANTIBIOTIC AGENTS STATUS: ICD-10-CM

## 2023-10-24 DIAGNOSIS — Z92.21 PERSONAL HISTORY OF ANTINEOPLASTIC CHEMOTHERAPY: ICD-10-CM

## 2023-10-24 DIAGNOSIS — G43.009 MIGRAINE WITHOUT AURA, NOT INTRACTABLE, WITHOUT STATUS MIGRAINOSUS: ICD-10-CM

## 2023-10-24 DIAGNOSIS — F41.9 ANXIETY DISORDER, UNSPECIFIED: ICD-10-CM

## 2023-10-24 DIAGNOSIS — C91.41 HAIRY CELL LEUKEMIA, IN REMISSION: ICD-10-CM

## 2023-10-24 DIAGNOSIS — K21.9 GASTRO-ESOPHAGEAL REFLUX DISEASE WITHOUT ESOPHAGITIS: ICD-10-CM

## 2023-10-24 DIAGNOSIS — F32.A DEPRESSION, UNSPECIFIED: ICD-10-CM

## 2023-10-24 DIAGNOSIS — R30.0 DYSURIA: ICD-10-CM

## 2023-10-24 DIAGNOSIS — M16.31 UNILATERAL OSTEOARTHRITIS RESULTING FROM HIP DYSPLASIA, RIGHT HIP: ICD-10-CM

## 2023-10-24 DIAGNOSIS — J45.909 UNSPECIFIED ASTHMA, UNCOMPLICATED: ICD-10-CM

## 2023-10-31 ENCOUNTER — RESULT REVIEW (OUTPATIENT)
Age: 48
End: 2023-10-31

## 2023-10-31 ENCOUNTER — APPOINTMENT (OUTPATIENT)
Dept: ORTHOPEDIC SURGERY | Facility: CLINIC | Age: 48
End: 2023-10-31
Payer: MEDICAID

## 2023-10-31 ENCOUNTER — OUTPATIENT (OUTPATIENT)
Dept: OUTPATIENT SERVICES | Facility: HOSPITAL | Age: 48
LOS: 1 days | End: 2023-10-31
Payer: COMMERCIAL

## 2023-10-31 VITALS
SYSTOLIC BLOOD PRESSURE: 129 MMHG | HEIGHT: 65 IN | OXYGEN SATURATION: 95 % | HEART RATE: 75 BPM | DIASTOLIC BLOOD PRESSURE: 77 MMHG | BODY MASS INDEX: 32.15 KG/M2 | WEIGHT: 193 LBS

## 2023-10-31 DIAGNOSIS — Z98.890 OTHER SPECIFIED POSTPROCEDURAL STATES: Chronic | ICD-10-CM

## 2023-10-31 DIAGNOSIS — Z96.642 PRESENCE OF LEFT ARTIFICIAL HIP JOINT: Chronic | ICD-10-CM

## 2023-10-31 PROBLEM — C91.40 HAIRY CELL LEUKEMIA NOT HAVING ACHIEVED REMISSION: Chronic | Status: ACTIVE | Noted: 2023-10-13

## 2023-10-31 PROBLEM — K21.9 GASTRO-ESOPHAGEAL REFLUX DISEASE WITHOUT ESOPHAGITIS: Chronic | Status: ACTIVE | Noted: 2023-10-13

## 2023-10-31 PROBLEM — F41.9 ANXIETY DISORDER, UNSPECIFIED: Chronic | Status: ACTIVE | Noted: 2023-10-13

## 2023-10-31 PROBLEM — G43.909 MIGRAINE, UNSPECIFIED, NOT INTRACTABLE, WITHOUT STATUS MIGRAINOSUS: Chronic | Status: ACTIVE | Noted: 2023-10-13

## 2023-10-31 PROCEDURE — 73502 X-RAY EXAM HIP UNI 2-3 VIEWS: CPT

## 2023-10-31 PROCEDURE — 99024 POSTOP FOLLOW-UP VISIT: CPT

## 2023-10-31 PROCEDURE — 73502 X-RAY EXAM HIP UNI 2-3 VIEWS: CPT | Mod: 26,RT

## 2023-11-28 ENCOUNTER — APPOINTMENT (OUTPATIENT)
Dept: ORTHOPEDIC SURGERY | Facility: CLINIC | Age: 48
End: 2023-11-28

## 2023-12-10 ENCOUNTER — NON-APPOINTMENT (OUTPATIENT)
Age: 48
End: 2023-12-10

## 2024-01-21 ENCOUNTER — NON-APPOINTMENT (OUTPATIENT)
Age: 49
End: 2024-01-21

## 2024-01-23 ENCOUNTER — RX RENEWAL (OUTPATIENT)
Age: 49
End: 2024-01-23

## 2024-01-23 RX ORDER — MELOXICAM 15 MG/1
15 TABLET ORAL DAILY
Qty: 30 | Refills: 2 | Status: ACTIVE | COMMUNITY
Start: 2021-06-29 | End: 1900-01-01

## 2024-01-31 ENCOUNTER — APPOINTMENT (OUTPATIENT)
Dept: ORTHOPEDIC SURGERY | Facility: CLINIC | Age: 49
End: 2024-01-31

## 2024-02-14 ENCOUNTER — RX RENEWAL (OUTPATIENT)
Age: 49
End: 2024-02-14

## 2024-03-19 ENCOUNTER — RX RENEWAL (OUTPATIENT)
Age: 49
End: 2024-03-19

## 2024-03-19 RX ORDER — MELOXICAM 10 MG/1
10 CAPSULE ORAL
Qty: 90 | Refills: 2 | Status: ACTIVE | COMMUNITY
Start: 2024-03-19 | End: 1900-01-01

## 2024-03-27 NOTE — OCCUPATIONAL THERAPY INITIAL EVALUATION ADULT - ASSISTIVE DEVICE FOR TRANSFER: STAND/SIT, REHAB EVAL
rolling walker Is This A New Presentation, Or A Follow-Up?: Skin Lesion Additional History: Est patient with a spot of concern to her left temple.

## 2024-04-10 ENCOUNTER — OUTPATIENT (OUTPATIENT)
Dept: OUTPATIENT SERVICES | Facility: HOSPITAL | Age: 49
LOS: 1 days | End: 2024-04-10
Payer: COMMERCIAL

## 2024-04-10 ENCOUNTER — RESULT REVIEW (OUTPATIENT)
Age: 49
End: 2024-04-10

## 2024-04-10 ENCOUNTER — APPOINTMENT (OUTPATIENT)
Dept: ORTHOPEDIC SURGERY | Facility: CLINIC | Age: 49
End: 2024-04-10
Payer: MEDICAID

## 2024-04-10 VITALS
DIASTOLIC BLOOD PRESSURE: 78 MMHG | SYSTOLIC BLOOD PRESSURE: 117 MMHG | BODY MASS INDEX: 32.15 KG/M2 | HEART RATE: 77 BPM | WEIGHT: 193 LBS | HEIGHT: 65 IN | OXYGEN SATURATION: 96 %

## 2024-04-10 DIAGNOSIS — Z96.642 PRESENCE OF LEFT ARTIFICIAL HIP JOINT: Chronic | ICD-10-CM

## 2024-04-10 DIAGNOSIS — Z96.649 AFTERCARE FOLLOWING JOINT REPLACEMENT SURGERY: ICD-10-CM

## 2024-04-10 DIAGNOSIS — Z98.890 OTHER SPECIFIED POSTPROCEDURAL STATES: Chronic | ICD-10-CM

## 2024-04-10 DIAGNOSIS — Z90.49 ACQUIRED ABSENCE OF OTHER SPECIFIED PARTS OF DIGESTIVE TRACT: Chronic | ICD-10-CM

## 2024-04-10 DIAGNOSIS — Z47.1 AFTERCARE FOLLOWING JOINT REPLACEMENT SURGERY: ICD-10-CM

## 2024-04-10 PROCEDURE — 73502 X-RAY EXAM HIP UNI 2-3 VIEWS: CPT

## 2024-04-10 PROCEDURE — 73502 X-RAY EXAM HIP UNI 2-3 VIEWS: CPT | Mod: 26,RT

## 2024-04-10 PROCEDURE — 99214 OFFICE O/P EST MOD 30 MIN: CPT

## 2024-04-10 RX ORDER — GABAPENTIN 300 MG/1
300 CAPSULE ORAL
Qty: 30 | Refills: 2 | Status: ACTIVE | COMMUNITY
Start: 2024-04-10 | End: 1900-01-01

## 2024-04-11 NOTE — DISCUSSION/SUMMARY
[de-identified] : 49F now approximately 6 months status post right total hip replacement with ongoing severe left flat foot deformity and some evidence of right lumbar radiculopathy; as well as s/p previous well-functioning L VERONICA.  - She would benefit from some further physical therapy to deal with the ongoing hip muscle weakness. I provided her with a new PT referral today as well as a copy of our home exercise program. - To address her neuropathic burning pain I provided her with a prescription for gabapentin to be taken at nighttime. - She will continue to follow up with an outside foot and ankle specialist to deal with the left severe flat foot deformity. - We'll plan to follow up next in 6 months with repeat bilateral hip x-rays.

## 2024-04-11 NOTE — PHYSICAL EXAM
[de-identified] : General appearance: well nourished and hydrated, pleasant, alert and oriented x 3, cooperative.   HEENT: normocephalic, EOM intact, wearing mask, external auditory canal clear.   Cardiovascular: no lower leg edema, no varicosities, dorsalis pedis pulses palpable and symmetric.   Lymphatics: no palpable lymphadenopathy, no lymphedema.   Neurologic: sensation is normal, no muscle weakness in upper or lower extremities, patella tendon reflexes present and symmetric.   Dermatologic: skin moist, warm, no rash.   Spine: cervical spine with normal lordosis and painless range of motion, thoracic spine with normal kyphosis and painless range of motion, lumbosacral spine with normal lordosis and painless range of motion.  No tenderness to palpation along midline spine and paraspinal musculature.  Sacroiliac joints nontender bilaterally. Negative SLR and crossed SLR tests bilaterally. Gait: presents using a cane demonstrating significant left sided antalgia with left hind foot collapse  Limb lengths: clinically similar at the level of the malleoli  Right hip:  - Focal soft tissue swelling: none - Ecchymosis: none - Erythema: none - Wounds: well healed posterolateral incision, well healed pelvic stab incisions, all benign appearing - Tenderness: no anterior tenderness to palpation, minimal lateral trochanteric tenderness to palpation, some burning parasthesias over the posterolateral buttock - ROM:    - Flexion: 105   - Extension: 0   - Adduction: 20   - Abduction: 30   - Internal rotation in 90 degrees of hip flexion: 15   - External rotation in 90 degrees of hip flexion: 20 - ED: negative - FADIR: negative - Kiana: negative - Stinchfield: negative - Flexor power: 4+/5 - Abductor power: 4/5 [de-identified] : AP Pelvic and right hip x-rays 4/10/14 [facility] - These demonstrate stable appearance of her bilateral total hip replacements as compared to previsou imaging without evidence of mechanical complication.

## 2024-04-11 NOTE — ADDENDUM
[FreeTextEntry1] :  I, Aric Martinez, acted as a scribe on behalf of Dr. Jason 04/10/2024.   I, John Wade, acted as a scribe on behalf of Dr. Jason 04/10/2024.

## 2024-04-11 NOTE — END OF VISIT
[FreeTextEntry3] :  All medical record entries made by the Scribe were at my, Dr. Kian Jason's, direction and personally dictated by me on 04/10/2024. I have reviewed the chart and agree that the record accurately reflects my personal performance of the history, physical exam, assessment and plan. I have also personally directed, reviewed, and agreed with the chart.

## 2024-04-11 NOTE — HISTORY OF PRESENT ILLNESS
[de-identified] : R VERONICA 10/16/23 L VERONICA 2015, Dr. Alexis  4/10/2024 48 y/o F presenting six months status post right VERONICA posterior approach, doing well today. She denies any pain or complaints, and takes Meloxicam as needed. She's been participating in physical therapy up until two weeks ago. She noted some right lateral discomfort. Other than that, she's doing well.  08/08/2023: 47 y/o female following up for left total hip replacement, right hip osteoarthritis, and severe left flat foot. She was last seen six months ago at which time she requested that we continue with conservative management as she was still seeking definitive care of her left flat foot disorder. She reports that in the intervening six months her right hip pain has continued to progress and worsen. She is currently taking turmeric and 30 mg of Meloxicam per day but notes worsening stomach pain with this regiment and still complains that the right hip pain is not well controlled. She's been exercising as much as possible, but this is becoming increasingly difficult. She notes ongoing pain and loss of function secondary to her left flat foot disorder. She did recently see another foot and ankle doctor who told her that her situation was too complex to consider an operation and recommended ongoing brace wear instead. She has been ambulating with a left ankle support brace as well as a cane but notes that basic ambulation even for short distances is becoming more challenging secondary to both the left foot and the right hip. At this point, she would say that they are both equally bothersome to her.  02/07/2023: 48 y/o female f/u for right hip OA as well as left VERONICA and left knee OA. We last saw each other approximately 6 months ago during which time we were continuing with conservative management measures for the hip. She was also following with Dr. Arrington for her primary pain generator which continues to be her left foot and ankle with flatfoot deformity. She reports that in the interim, she received some local injections to the left hindfoot by Dr. Arrington but has since lost access to him since he lost the practice. She has been trying to pursue other leads for foot and ankle orthopedists without success secondary to insurance coverage issues. The symptoms of all the affected joints remain about the same as during our last visit. The right hip is consistently always painful. She complains of locking when changing positions from seated to standing. She requires the use of bilateral arm pushup to get out of any seated position and she requires the use of a cane at all times. She also complains of a difficult living situation being on a second story walkup building. She has been experiencing progressive difficulty climbing the stairs both up and down and feels that she is at risk for falling down the stairs.   8/9/22: Following up for right hip and left knee osteoarthritis. The worst pain generator remains the left foot and ankle, currently under management by Dr. Arrington. The right hip is also moderately painful all the time and the source of some limping. Left knee has been nonpainful since the injection given by me last summer. She has been using a left Festus brace per Dr. Arrington since the start of 2022. Left hindfoot surgery has been mentioned but no definitive plans have yet been made.  6/29/21: 47y/o female presenting for left knee, right foot, and right hip pain. Symptoms all progressive for several years. Left hip was replaced by Dr. Alexis in 2015 and she has no complaints. Left knee with diffuse pain; previously responded to a CSI in 2018. Right flatfoot deformity associated with hindfoot and posteromedial ankle pain, for which she uses a figure of eight brace (doesn't help much). The left foot is also flat and unstable but much less painful. Right hip is less painful than the left knee and right foot. 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 She has moderate low back pain.  PMH sig for asthma and hairy cell leukemia, in remission since 2014.

## 2024-08-28 ENCOUNTER — NON-APPOINTMENT (OUTPATIENT)
Age: 49
End: 2024-08-28

## 2024-10-09 ENCOUNTER — RESULT REVIEW (OUTPATIENT)
Age: 49
End: 2024-10-09

## 2024-10-09 ENCOUNTER — APPOINTMENT (OUTPATIENT)
Dept: ORTHOPEDIC SURGERY | Facility: CLINIC | Age: 49
End: 2024-10-09
Payer: MEDICAID

## 2024-10-09 ENCOUNTER — OUTPATIENT (OUTPATIENT)
Dept: OUTPATIENT SERVICES | Facility: HOSPITAL | Age: 49
LOS: 1 days | End: 2024-10-09
Payer: MEDICAID

## 2024-10-09 VITALS
OXYGEN SATURATION: 96 % | BODY MASS INDEX: 32.15 KG/M2 | SYSTOLIC BLOOD PRESSURE: 121 MMHG | HEART RATE: 62 BPM | WEIGHT: 193 LBS | DIASTOLIC BLOOD PRESSURE: 75 MMHG | HEIGHT: 65 IN

## 2024-10-09 DIAGNOSIS — Z90.49 ACQUIRED ABSENCE OF OTHER SPECIFIED PARTS OF DIGESTIVE TRACT: Chronic | ICD-10-CM

## 2024-10-09 DIAGNOSIS — Z96.642 PRESENCE OF LEFT ARTIFICIAL HIP JOINT: Chronic | ICD-10-CM

## 2024-10-09 DIAGNOSIS — Z47.1 AFTERCARE FOLLOWING JOINT REPLACEMENT SURGERY: ICD-10-CM

## 2024-10-09 DIAGNOSIS — Z98.890 OTHER SPECIFIED POSTPROCEDURAL STATES: Chronic | ICD-10-CM

## 2024-10-09 DIAGNOSIS — M48.061 SPINAL STENOSIS, LUMBAR REGION WITHOUT NEUROGENIC CLAUDICATION: ICD-10-CM

## 2024-10-09 DIAGNOSIS — Z96.649 AFTERCARE FOLLOWING JOINT REPLACEMENT SURGERY: ICD-10-CM

## 2024-10-09 PROCEDURE — 99214 OFFICE O/P EST MOD 30 MIN: CPT

## 2024-10-09 PROCEDURE — 73521 X-RAY EXAM HIPS BI 2 VIEWS: CPT | Mod: 26

## 2024-10-09 PROCEDURE — 73521 X-RAY EXAM HIPS BI 2 VIEWS: CPT

## 2025-01-14 ENCOUNTER — RX RENEWAL (OUTPATIENT)
Age: 50
End: 2025-01-14

## 2025-04-16 ENCOUNTER — RX RENEWAL (OUTPATIENT)
Age: 50
End: 2025-04-16

## 2025-08-16 ENCOUNTER — NON-APPOINTMENT (OUTPATIENT)
Age: 50
End: 2025-08-16

## (undated) DEVICE — DRSG AQUACEL 3.5 X 10"

## (undated) DEVICE — SUT STRATAFIX SPIRAL PDO 2-0 36CM MH

## (undated) DEVICE — ELCTR STRYKER NEPTUNE SMOKE EVACUATION PENCIL (GREEN)

## (undated) DEVICE — SPECIMEN CONTAINER 4OZ

## (undated) DEVICE — DRAPE LIGHT HANDLE COVER (GREEN)

## (undated) DEVICE — MAKO VIZADISC HIP PROCEDURE TRACKING KIT

## (undated) DEVICE — SUT STRATAFIX SPIRAL PDO 1 30CM OS-6

## (undated) DEVICE — GLV 7 PROTEXIS ORTHO (CREAM)

## (undated) DEVICE — PREP CHLORAPREP HI-LITE ORANGE 26ML

## (undated) DEVICE — SUT VICRYL 2-0 27" CT-1 UNDYED

## (undated) DEVICE — NDL HYPO SAFE 22G X 1.5" (BLACK)

## (undated) DEVICE — SAW BLADE STRYKER SAGITTAL 81.5X12.5X1.19MM

## (undated) DEVICE — DRSG STOCKINETTE IMPERVIOUS XL

## (undated) DEVICE — DRSG COBAN 6"

## (undated) DEVICE — GLV 7.5 PROTEXIS ORTHO (CREAM)

## (undated) DEVICE — MAKO DRAPE KIT

## (undated) DEVICE — SUT ETHIBOND 1 30" OS8

## (undated) DEVICE — DRAPE TOWEL BLUE 17" X 24"

## (undated) DEVICE — WOUND IRR SURGIPHOR

## (undated) DEVICE — POSITIONER FOAM ABDUCTION PILLOW LG (PINK)

## (undated) DEVICE — SUT STRATAFIX SPIRAL PDO 0 24CM CP-2

## (undated) DEVICE — HOOD T7 PLUS PEELAWAY

## (undated) DEVICE — SUT ETHILON 3-0 18" PS-1

## (undated) DEVICE — DRSG PREVENA PLUS SYSTEM

## (undated) DEVICE — SUT VICRYL 0 36" CT-1 UNDYED

## (undated) DEVICE — DRAPE U POLY BLUE 60X72"

## (undated) DEVICE — SUT STRATAFIX SPIRAL MONOCRYL PLUS 3-0 30CM PS-1 UNDYED

## (undated) DEVICE — DRAPE LIGHT HANDLE COVER (BLUE)

## (undated) DEVICE — SUT ETHIBOND 5 4-30" V-37

## (undated) DEVICE — PACK TOTAL HIP

## (undated) DEVICE — BLADE SURGICAL #15 CARBON

## (undated) DEVICE — HOOD FLYTE STRYKER HELMET SHIELD